# Patient Record
(demographics unavailable — no encounter records)

---

## 2018-01-10 NOTE — MISCELLANEOUS
Physical Exam    Wound #1 Assessment wound #1 Location:, sacrum, started on unknown, Care for this wound started on 3/15/2016  Wound Status: not healed  Pressure Ulcer Grade: Stage IV  Length: 10cm x Width: 11 2cm x Depth: 2cm   Total: 112sq cm   Wound Volume: 224cm3       Undermining 4cm from 9 o'clock to 5 o'clock     deepest at 5 o clock   Tissue type: Subcutaneous, Granulation, Slough and Exposed bone   Color of Wound: Yellow - 30% and Pink - 65% 5% bone   Exudate Amount: Moderate   Exudate Type: Serosangiunous   Odor: None   Exudate Color: Yellow   Wound Edges: Rolled (epibolized)   Periwound Skin Condition: Erythematous        Physician/Provider Wound #1 Exam   I agree with the nursing assessment and documentation  Sacral wound with small area of bone exposed, slough and granulation tissue noted, periwound clean  Wound Drsg  Orders/Instructions  Wound Identification Dressing Orders--Instructions:   Wound Identification and Instructions   Wound #1: sacrum    Wound Care Instructions  Discussed with Patient/Caregiver  Dressing Type: [place] sheet  Wash with mild soap and water, normal saline, wound cleanser  Secondary dressing apply: 5 x 9, 4x4 gauze  Secure with: Tape  Dressing change frequency: Daily  Comments/Other:           Wound Goals  Wound Goals:   Healing Goals:   Fair healing potential, expect slow healing progress secondary to co-morbid conditions and advanced age  Wound base will be 25%    Patient will achieve appropriate pressure redistribution for wound prevention       Judy Saunders 1: Wound Nursing Care Plan   Impaired Tissue Integrity related to: sacrum   Knowledge Deficit Related To: sacrum   Goals   Patient will maintain skin integrity:  Patient will demonstrate and verbalize knowledge of their disease process and management:     Patient will verbalize knowledge of and demonstrate adherence to interventions to achieve optimal nutrition required for wound healing:  Wound Nursing Care Interventions:   Implement offloading measures (turn & reposition schedule/method, ortho shoes/boots, floating heels, crutches, specialty surfaces:  Teach and evaluate effectiveness of offloading measures:     Teach patient and/or family about disease process and management methods:    Evaluate effectiveness of all above measures every 4 weeks with Patient Specific CQI:    Other:  plan of care initiated 3/15/2016, reviewed 4/12/2016      Signatures   Electronically signed by : Lula Durham MD; Apr 12 2016  4:29PM EST                       (Author)    Electronically signed by : Lula Durham MD; Apr 18 2016  1:14PM EST                       (Author)

## 2018-01-10 NOTE — MISCELLANEOUS
Physical Exam    Wound #1 Assessment wound #1 Location:, sacrum, started on unknown, Care for this wound started on 3/15/2016  Wound Status: not healed  Pressure Ulcer Grade: Stage IV  Length: 10cm x Width: 13cm x Depth: 3 5cm   Total: 130sq cm   Wound Volume: 455cm3       Undermining 5cm from 10 o'clock to 5 o'clock     deepest at 3 o clock   Tissue type: Subcutaneous, Granulation, Slough, Eschar and Exposed bone   Color of Wound: Red - 10%, Yellow - 40%, Black - 10% and Pink - 30% 10% bone   Exudate Amount: Moderate   Exudate Type: Serosangiunous   Odor: None   Wound Edges: Rolled (epibolized)   Periwound Skin Condition: Erythematous, blanchable erythema        Physician/Provider Wound #1 Exam   I agree with the nursing assessment and documentation  Wound Drsg  Orders/Instructions  Wound Identification Dressing Orders--Instructions:   Wound Identification and Instructions   Wound #1: sacrum    Wound Care Instructions  Discussed with Patient/Caregiver  Wash with mild soap and water, normal saline, wound cleanser or as specified  Apply 4% Topical Lidocaine anesthetic solution PRN to wound/ulcer prior to debridement for pain control  To periwound apply: nss moisten gauze  Secondary dressing apply: 5 x 9, and tape  Dressing change frequency: Three times per week  Comments/Other:   Nss moisten kerlix applied to wound bed today  NPWT to be applied at [facility]  Skin prep to ivon wound, Adaptic, Black sponge in wound bed, right buttock steri strip or VAC drape together, bridge to hip, seal at 125mmHg, continuous  Change 3x week      Wound Goals  Wound Goals:   Healing Goals:   Fair healing potential, expect slow healing progress secondary to co-morbid conditions and advanced age  Wound base will be 25%    Patient will achieve appropriate pressure redistribution for wound prevention       Judy Saunders 1:    Wound Nursing Care Plan   Impaired Tissue Integrity related to: sacrum   Knowledge Deficit Related To: sacrum   Goals   Patient will maintain skin integrity:  Patient will demonstrate and verbalize knowledge of their disease process and management:  Patient will verbalize knowledge of and demonstrate adherence to interventions to achieve optimal nutrition required for wound healing:  Wound Nursing Care Interventions:   Implement offloading measures (turn & reposition schedule/method, ortho shoes/boots, floating heels, crutches, specialty surfaces:  Teach and evaluate effectiveness of offloading measures:     Teach patient and/or family about disease process and management methods:    Evaluate effectiveness of all above measures every 4 weeks with Patient Specific CQI:    Other:  plan of care initiated 3/15/2016      Signatures   Electronically signed by : SHERRY James CRNP; Mar 24 2016  9:54AM EST                       (Author)    Electronically signed by : Ty Thorpe MD; Mar 28 2016  7:28AM EST                       (Author)

## 2018-01-10 NOTE — MISCELLANEOUS
Physical Exam    Wound #1 Assessment wound #1 Location:, sacrum, started on unknown, Care for this wound started on 3/15/2016  Wound Status: not healed  Pressure Ulcer Grade: Stage IV  Length: 9cm x Width: 6 5cm x Depth: 2 6cm   Total: 58 5sq cm   Wound Volume: 152 1cm3       Undermining 2 9cm from 8 o'clock to 4 o'clock     deepest at 4 o clock   Tissue type: Subcutaneous, Granulation and Slough   Color of Wound: Yellow - 50% and Pink - 50%   Exudate Amount: Moderate   Exudate Type: Serosangiunous   Odor: None   Exudate Color: Yellow   Wound Edges: Rolled (epibolized)   Periwound Skin Condition: Induration        Physician/Provider Wound #1 Exam   I agree with the nursing assessment and documentation  Sacrum with rolled edges, pink tissue on base, periwound intact  Wound Drsg  Orders/Instructions  Wound Identification Dressing Orders--Instructions:   Wound Identification and Instructions   Wound #1: sacrum    Wound Care Instructions  Discussed with Patient/Caregiver  Wash with mild soap and water, normal saline, wound cleanser  Secondary dressing apply: 5 x 9, 4x4 gauze  Secure with: Tape  Dressing change frequency: Daily, prn dislodgement/soilage  Comments/Other:   nss moisten gauze, 4x4, 5x9, tape  Change daily and prn dislodgement/soilage      Wound Goals  Wound Goals:   Healing Goals:   Fair healing potential, expect slow healing progress secondary to co-morbid conditions and advanced age  Wound depth will decrease by 25%   Patient will achieve appropriate pressure redistribution for wound prevention       Judy Saunders 1: Wound Nursing Care Plan   Impaired Tissue Integrity related to: sacrum   Knowledge Deficit Related To: sacrum   Goals   Patient will maintain skin integrity:  Patient will demonstrate and verbalize knowledge of their disease process and management:     Patient will verbalize knowledge of and demonstrate adherence to interventions to achieve optimal nutrition required for wound healing:  Wound Nursing Care Interventions:   Implement offloading measures (turn & reposition schedule/method, ortho shoes/boots, floating heels, crutches, specialty surfaces:  Teach and evaluate effectiveness of offloading measures:     Teach patient and/or family about disease process and management methods:    Evaluate effectiveness of all above measures every 4 weeks with Patient Specific CQI:    Other:  plan of care initiated 3/15/2016, reviewed 4/12/2016, reviewed 5/3/2016      Signatures   Electronically signed by : Phil Davidson MD; May 24 2016  3:57PM EST                       (Author)    Electronically signed by : Phil Davidson MD; John  3 2016  4:24PM EST                       (Author)

## 2018-01-10 NOTE — PROGRESS NOTES
Assessment    1  Decubitus ulcer of sacral region, stage 4 (707 03,707 24) (L89 [id number])    Plan  Decubitus ulcer of sacral region, stage 4    · Other Wound Care Instructions; Status:Complete;   Done: [id number] 04:27PM   Ordered; For:Decubitus ulcer of sacral region, stage 4; Ordered By:Romulo Simmons;  Instruction : wet to dry gauze    Wound Care Orders/Instructions    Wound Identification and Instructions   Wound #1: sacrum    Wound Care Instructions  Discussed with Patient/Caregiver  Wash with mild soap and water, normal saline, wound cleanser  Secondary dressing apply: 5 x 9, 4x4 gauze  Secure with: Tape  Dressing change frequency: Daily, prn dislodgement/soilage  Comments/Other:   nss moisten gauze, 4x4, 5x9, tape  Change daily and prn dislodgement/soilage      Wound Goals  Wound Goals:   Healing Goals:   Fair healing potential, expect slow healing progress secondary to co-morbid conditions and advanced age  Wound edges will appear with evidence of contraction and epithelialization   Patient will achieve appropriate pressure redistribution for wound prevention       Discussion/Summary    Wound clean and slowly improving  Will continue wet to dry dressings  Chief Complaint  Follow up for sacrum      History of Present Illness    Wound Identification HPI   Wound #1: sacrum          The patient came to Wound Care via wheelchair, assist x2 to exam chair  The patient is being seen for a follow-up with MD and @ [facility] at the [facility]  The patient is accompanied by his staff of [facility]  The patient's identification was verified  A secondary verification process was completed  Orientation: oriented to person, oriented to place and oriented to time  Blood Glucose:  Not applicable  3/15/2016: Spouse requested patient be evaluated for sacral wound  Spouse was not present at visit today  Patient arrived with lidocaine intact to sacrum    3/22/2016: Arrived with Lidocaine intact to sacrum  3/29/2016: Arrived with lidocaine intact to sacrum  4/5/2016: Arrived with lidocaine intact to the sacrum  4/12/2016: Arrived with lidocaine intact to sacrum  4/19/2016:Arrived with lidocaine intact to sacrum  4/26/2016: Arrived with lidocaine intact to sacrum  5/3/2016: Arrived with nss moisten gauze, 5x9, tape intact to sacrum  5/10/2016: Arrived with lidocaine intact to sacrum  SDTI to ivon wound  History of Falling: Yes = 25   Secondary Diagnosis: Yes = 15   Ambulatory Aid None, Bedrest, Nurse Assist = 0   No = 0   Gait: Impaired = 20 -- Short steps with shuffle, may have difficulty arising from chair, head down, significantly impaired balance, requiring furniture, support person, or walking aid to walk  Mental Status: Overestimates, forgets limitations = 15   Total Score: 75     > 45 = High Risk       The most recent fall occurred 5/2016 and fell out of chair  The fall resulted from fell out of chair  The fall was preceded by no known event  The fall resulted in fell on sacrum  A referral was made for did not go to the emergency department  Nutrition Assessment Screening: Food intake over the last 3 months due to the loss of appetite, digestive problems, chewing or swallowing difficulties is graded as: 2 = no decrease in food intake   Mobility scored as: 0 = bed or chair bound  Psychological Stress and Acute Disease Scored as: 0 = The patient has experienced psychological stress or acute disease in the last 3 months  Neuropsychological problems scored as: 1 = mild dementia  Provider Wound Care HPI: no new wound complaints         Pain Assessment   the patient states they have pain  The pain is located in the sacrum  The pain radiates to the denies  The patient describes the pain as burning  (on a scale of 0 to 10, the patient rates the pain at 3 )   Abuse And Domestic Violence Screen   Domestic violence screen not done today   Reason DV Screen not done: staff of [facility] present    Depression And Suicide Screen  Suicide screen not done today  Reason suicide screen not done: staff of [facility]  Prefered Language is  [place]  Primary Language is  English  Readiness To Learn: Receptive  Barriers To Learning: hard of hearing  Preferred Learning: written   Education Completed: treatment/procedure   Teaching Method: written   Person Taught: staff of [facility]    Evaluation Of Learning: verbalized/demonstrated understanding and needs reinforcement      Active Problems    1  Decubitus ulcer of sacral region, stage 4 (797 28,657 94) (D63 [id number])    Past Medical History    1  History of Enlarged prostate without lower urinary tract symptoms (600 00) (N40 0)   2  History of anemia (V12 3) (Z86 2)   3  History of essential hypertension (V12 59) (Z86 79)   4  History of glaucoma (V12 49) (Z86 69)   5  History of hypothyroidism (V12 29) (Z86 39)   6  History of Hypocalcemia (275 41) (E83 51)    Current Meds   1  Acetaminophen 325 MG Oral Tablet; Therapy: (Recorded:15Mar2016) to Recorded   2  Adult Aspirin EC Low Strength 81 MG Oral Tablet Delayed Release; Therapy: (Recorded:15Mar2016) to Recorded   3  Allopurinol 100 MG Oral Tablet; Therapy: (Recorded:15Mar2016) to Recorded   4  Cosopt 22 3-6 8 MG/ML Ophthalmic Solution; Therapy: (Recorded:15Mar2016) to Recorded   5  Ferrous Sulfate 325 (65 Fe) MG Oral Tablet; Therapy: (Recorded:15Mar2016) to Recorded   6  Flomax 0 4 MG CP24;   Therapy: (Recorded:15Mar2016) to Recorded   7  Gonak 2 5 % Ophthalmic Solution; Therapy: (Recorded:15Mar2016) to Recorded   8  Lasix 20 MG Oral Tablet; Therapy: (Recorded:15Mar2016) to Recorded   9  Levothyroxine Sodium 125 MCG CAPS; Therapy: (Recorded:15Mar2016) to Recorded   10  Lidocaine HCl (Local Anesth ) 4 % SOLN; apply prn to wound(s) prior to deridement for    pain control; Therapy: (Recorded:15Mar2016) to Recorded   11   Lumigan 0 01 % Ophthalmic Solution; Therapy: (Recorded:15Mar2016) to Recorded   12  Metoprolol Tartrate 25 MG Oral Tablet; Therapy: (Recorded:15Mar2016) to Recorded   13  OxyCODONE HCl - 5 MG Oral Tablet; Therapy: (Recorded:15Mar2016) to Recorded   14  Oyster Shell Calcium 500 MG Oral Tablet; Therapy: (Recorded:15Mar2016) to Recorded   15  Proscar 5 MG Oral Tablet; Therapy: (Recorded:15Mar2016) to Recorded   16  Sodium Bicarbonate 650 MG Oral Tablet; Therapy: (Recorded:15Mar2016) to Recorded   17  TobraDex 0 3-0 1 % Ophthalmic Suspension; Therapy: (Recorded:15Mar2016) to Recorded   18  Vitamin D3 1000 UNIT Oral Tablet; Therapy: (Recorded:15Mar2016) to Recorded    Allergies    1  Acetaminophen TABS   2  Hydrocodone-Acetaminophen CAPS   3  Morphine Sulfate TABS    Vitals  Vital Signs [Data Includes: Current Encounter]    Recorded: [id number] 04:21PM   Temperature 96 1 F, Tympanic   Heart Rate 80, R Radial   Pulse Quality Regular, R Radial   Respiration 18   Respiration Quality Normal   Systolic 181, RUE, Sitting   Diastolic 70, RUE, Sitting   Height 5 ft 8 in   Pain Scale 3     Physical Exam    Wound #1 Assessment wound #1 Location:, sacrum, started on unknown, Care for this wound started on 3/15/2016  Wound Status: not healed  Pressure Ulcer Grade: Stage IV  Length: 8cm x Width: 9cm x Depth: 2 7cm   Total: 72sq cm   Wound Volume: 194 4cm3       Undermining 4cm from 9 o'clock to 4 o'clock     deepest at 4 o clock   Tissue type: Subcutaneous, Granulation, Slough and Exposed bone   Color of Wound: Yellow - 30%, Black - 30% and Pink - 35% 5% bone exposed   Exudate Amount: Moderate   Exudate Type: Serosangiunous   Odor: None   Exudate Color: Yellow   Wound Edges: Rolled (epibolized)   Periwound Skin Condition: Erythematous, Macerated, Induration, SDTI to ivon wound area measures 4 [id number]        Physician/Provider Wound #1 Exam   I agree with the nursing assessment and documentation   Sacral wound mostly clean and granulating, edges rolling, periwound clean  Trg Chip 1: Wound Nursing Care Plan   Impaired Tissue Integrity related to: sacrum   Knowledge Deficit Related To: sacrum   Goals   Patient will maintain skin integrity:  Patient will demonstrate and verbalize knowledge of their disease process and management:  Patient will verbalize knowledge of and demonstrate adherence to interventions to achieve optimal nutrition required for wound healing:  Wound Nursing Care Interventions:   Implement offloading measures (turn & reposition schedule/method, ortho shoes/boots, floating heels, crutches, specialty surfaces:  Teach and evaluate effectiveness of offloading measures:  Teach patient and/or family about disease process and management methods:    Evaluate effectiveness of all above measures every 4 weeks with Patient Specific CQI:    Other:  plan of care initiated 3/15/2016, reviewed 4/12/2016, reviewed 5/3/2016      Procedure      Wound #1: sacrum     Nurse Dressing Change:   Wound #1 The wound located on the sacrum  Wound care rendered as per Physician/Advanced Practitioner order/plan  Order sent with patient to facility  Comments:    Excisional Debridement Subcutaneous Tissue:   Wound was excisionally debrided to subcutaneous tissue as follows  Prior to the procedure, the patient was identified using two identifiers, the general consent was signed, the proper site of procedure was identified, and a time out was taken  Anesthesia: Local anesthesia with 4% topical lidocaine was utilized prior to the procedure for pain control  A curette was utilized to surgically excise devitalized tissue and/or slough through epidermis, dermis and into the subcutaneous tissue  The total sq cm excised was 10sq cm  See wound assessment for further details  There was moderate bleeding controlled with gentle pressure and controlled with silver nitrate   the patient tolerated the procedure well without complication  CPT Code(s)   [id number] - Excisional DebridementTo Subcutaneous Tissue; first 20 sq cm        Signatures   Electronically signed by : Angelina Barclay MD; May 10 2016  4:28PM EST                       (Author)    Electronically signed by : Angelina Barclay MD; May 16 2016  9:40AM EST                       (Author)

## 2018-01-10 NOTE — MISCELLANEOUS
Physical Exam    Wound #1 Assessment wound #1 Location:, sacrum, started on unknown, Care for this wound started on 3/15/2016  Wound Status: not healed  Pressure Ulcer Grade: Stage IV  Length: 11cm x Width: 8cm x Depth: 2 4cm   Total: 88sq cm   Wound Volume: 211 2cm3       Undermining 4cm from 9 o'clock to 4 o'clock     deepest at 4 o clock   Tissue type: Subcutaneous, Granulation and Slough   Color of Wound: Red - 60% and Yellow - 40%   Exudate Amount: Moderate   Exudate Type: Serosangiunous   Odor: None   Exudate Color: Yellow   Wound Edges: Rolled (epibolized)   Periwound Skin Condition: Erythematous, Induration        Physician/Provider Wound #1 Exam   I agree with the nursing assessment and documentation  Sacral wound with healthy pink/granular tissue throughout wound, edges slightly rolled, periwound clean  Wound Drsg  Orders/Instructions  Wound Identification Dressing Orders--Instructions:   Wound Identification and Instructions   Wound #1: sacrum    Wound Care Instructions  Discussed with Patient/Caregiver  Wash with mild soap and water, normal saline, wound cleanser  Secondary dressing apply: 5 x 9, 4x4 gauze  Secure with: Tape  Dressing change frequency: Daily, prn dislodgement/soilage  Comments/Other:   nss moisten gauze, 4x4, 5x9, tape  Change daily and prn dislodgement/soilage      Wound Goals  Wound Goals:   Healing Goals:   Fair healing potential, expect slow healing progress secondary to co-morbid conditions and advanced age  Wound edges will appear with evidence of contraction and epithelialization   Patient will achieve appropriate pressure redistribution for wound prevention       Judy Saunders 1: Wound Nursing Care Plan   Impaired Tissue Integrity related to: sacrum   Knowledge Deficit Related To: sacrum   Goals   Patient will maintain skin integrity:     Patient will demonstrate and verbalize knowledge of their disease process and management:  Patient will verbalize knowledge of and demonstrate adherence to interventions to achieve optimal nutrition required for wound healing:  Wound Nursing Care Interventions:   Implement offloading measures (turn & reposition schedule/method, ortho shoes/boots, floating heels, crutches, specialty surfaces:  Teach and evaluate effectiveness of offloading measures:     Teach patient and/or family about disease process and management methods:    Evaluate effectiveness of all above measures every 4 weeks with Patient Specific CQI:    Other:  plan of care initiated 3/15/2016, reviewed 4/12/2016, reviewed 5/3/2016      Signatures   Electronically signed by : Noah Gibson MD; May  3 2016  3:53PM EST                       (Author)    Electronically signed by : Noha Gibson MD; May  9 2016  9:40AM EST                       (Author)

## 2018-01-11 NOTE — PROGRESS NOTES
Assessment    1  Decubitus ulcer of sacral region, stage 4 (707 03,707 24) (L89 154)    Plan  Decubitus ulcer of sacral region, stage 4    · Other Wound Care Instructions; Status:Complete;   Done: [id number] 02:14PM   Ordered; For:Decubitus ulcer of sacral region, stage 4; Ordered By:Romulo Simmons;  Instruction : wet to dry gauze    Wound Care Orders/Instructions    Wound Identification and Instructions   Wound #1: sacrum    Wound Care Instructions  Discussed with Patient/Caregiver  Dressing Type: Plain gauze  Wash with mild soap and water, normal saline, wound cleanser  Apply 4% Topical Lidocaine anesthetic solution PRN to wound/ulcer prior to debridement for pain control  Apply specified dressing to wound base/bed  Secondary dressing apply: 5 x 9  Secure with: Tape  Dressing change frequency: Daily, and prn soilage/dislodgement  Comments/Other:   NSS wet to day      Wound Goals  Wound Goals:   Healing Goals:   Fair healing potential, expect slow healing progress secondary to co-morbid conditions and advanced age  Wound depth will decrease by 25%   Patient will achieve appropriate pressure redistribution for wound prevention       Discussion/Summary    Sacral wound continues to get smaller with current regime  May need to aggressively remove all rolled edges in next visit or two  Chief Complaint  Follow up for sacrum      History of Present Illness    Wound Identification HPI   Wound #1: sacrum          The patient came to Wound Care via wheelchair, assist x2 to exam chair  The patient is being seen for a follow-up with MD and @ [facility] [address] at the [address] [place] [address]  The patient is accompanied by his staff of [facility] [address]  The patient's identification was verified  A secondary verification process was completed  Orientation: oriented to person, oriented to place and oriented to time  Blood Glucose:  Not applicable     3/15/2016: Spouse requested patient be evaluated for sacral wound  Spouse was not present at visit today  Patient arrived with lidocaine intact to sacrum    3/22/2016: Arrived with Lidocaine intact to sacrum  3/29/2016: Arrived with lidocaine intact to sacrum  4/5/2016: Arrived with lidocaine intact to the sacrum  4/12/2016: Arrived with lidocaine intact to sacrum  4/19/2016:Arrived with lidocaine intact to sacrum  4/26/2016: Arrived with lidocaine intact to sacrum  5/3/2016: Arrived with nss moisten gauze, 5x9, tape intact to sacrum  5/10/2016: Arrived with lidocaine intact to sacrum  SDTI to ivon wound  5/17/2016: Arrived with nss moisten gauze stool covered  Incontinent of stool  Stool found in wound bed  5/24/2016: Arrived with lidocaine intact to sacrum  5/31/16: The patient arrived with lidocaine soaked gauze intact to sacral wound  History of Falling: Yes = 25   Secondary Diagnosis: Yes = 15   Ambulatory Aid None, Bedrest, Nurse Assist = 0   No = 0   Gait: Impaired = 20 -- Short steps with shuffle, may have difficulty arising from chair, head down, significantly impaired balance, requiring furniture, support person, or walking aid to walk  Mental Status: Overestimates, forgets limitations = 15   Total Score: 75     > 45 = High Risk       The most recent fall occurred 5/2016 and denies any recent falls  Nutrition Assessment Screening: Food intake over the last 3 months due to the loss of appetite, digestive problems, chewing or swallowing difficulties is graded as: 2 = no decrease in food intake   Mobility scored as: 0 = bed or chair bound  Psychological Stress and Acute Disease Scored as: 0 = The patient has experienced psychological stress or acute disease in the last 3 months  Neuropsychological problems scored as: 1 = mild dementia  Provider Wound Care HPI: no new wound complaints         Pain Assessment   the patient states they do not have pain   (on a scale of 0 to 10, the patient rates the pain at 0 )   Abuse And Domestic Violence Screen   Domestic violence screen not done today  Reason DV Screen not done: staff of [address] [place] [address] present    Depression And Suicide Screen  Suicide screen not done today  Reason suicide screen not done: staff of [address] [place] [address]  Prefered Language is  Michele Moyer  Primary Language is  English  Readiness To Learn: Receptive  Barriers To Learning: hard of hearing  Preferred Learning: written   Education Completed: treatment/procedure   Teaching Method: written   Person Taught: staff of [facility]    Evaluation Of Learning: verbalized/demonstrated understanding and needs reinforcement      Active Problems    1  Decubitus ulcer of sacral region, stage 4 (834 35,504 92) (C85 767)    Past Medical History    1  History of Enlarged prostate without lower urinary tract symptoms (600 00) (N40 0)   2  History of anemia (V12 3) (Z86 2)   3  History of essential hypertension (V12 59) (Z86 79)   4  History of glaucoma (V12 49) (Z86 69)   5  History of hypothyroidism (V12 29) (Z86 39)   6  History of Hypocalcemia (275 41) (E83 51)    Current Meds   1  Acetaminophen 325 MG Oral Tablet; Therapy: (Recorded:15Mar2016) to Recorded   2  Adult Aspirin EC Low Strength 81 MG Oral Tablet Delayed Release; Therapy: (Recorded:15Mar2016) to Recorded   3  Allopurinol 100 MG Oral Tablet; Therapy: (Recorded:15Mar2016) to Recorded   4  Cosopt 22 3-6 8 MG/ML Ophthalmic Solution; Therapy: (Recorded:15Mar2016) to Recorded   5  Ferrous Sulfate 325 (65 Fe) MG Oral Tablet; Therapy: (Recorded:15Mar2016) to Recorded   6  Flomax 0 4 MG CP24;   Therapy: (Recorded:15Mar2016) to Recorded   7  Gonak 2 5 % Ophthalmic Solution; Therapy: (Recorded:15Mar2016) to Recorded   8  Lasix 20 MG Oral Tablet; Therapy: (Recorded:15Mar2016) to Recorded   9  Levothyroxine Sodium 125 MCG CAPS; Therapy: (Recorded:15Mar2016) to Recorded   10   Lidocaine HCl (Local Anesth ) 4 % SOLN; apply prn to wound(s) prior to deridement for    pain control; Therapy: (Recorded:15Mar2016) to Recorded   11  Lumigan 0 01 % Ophthalmic Solution; Therapy: (Recorded:15Mar2016) to Recorded   12  Metoprolol Tartrate 25 MG Oral Tablet; Therapy: (Recorded:15Mar2016) to Recorded   13  OxyCODONE HCl - 5 MG Oral Tablet; Therapy: (Recorded:15Mar2016) to Recorded   14  Oyster Shell Calcium 500 MG Oral Tablet; Therapy: (Recorded:15Mar2016) to Recorded   15  Proscar 5 MG Oral Tablet; Therapy: (Recorded:15Mar2016) to Recorded   16  Sodium Bicarbonate 650 MG Oral Tablet; Therapy: (Recorded:15Mar2016) to Recorded   17  TobraDex 0 3-0 1 % Ophthalmic Suspension; Therapy: (Recorded:15Mar2016) to Recorded   18  Vitamin D3 1000 UNIT Oral Tablet; Therapy: (Recorded:15Mar2016) to Recorded    Allergies    1  Acetaminophen TABS   2  Hydrocodone-Acetaminophen CAPS   3  Morphine Sulfate TABS    Vitals  Vital Signs [Data Includes: Current Encounter]    Recorded: 48PKW0065 01:50PM   Temperature 97 6 F, Tympanic   Heart Rate 80   Respiration 18   Systolic 056   Diastolic 58   Height 5 ft 8 in   Weight Unobtainable Yes   Pain Scale 0     Physical Exam    Wound #1 Assessment wound #1 Location:, sacrum, started on unknown, Care for this wound started on 3/15/2016  Wound Status: not healed  Pressure Ulcer Grade: Stage IV  Length: 8 8cm x Width: 6 3cm x Depth: 1 7cm   Total: 55 44sq cm   Wound Volume: 94 248cm3       Undermining 2 1cm from 7 o'clock to 4 o'clock     deepest at 4 o clock   Tissue type: Subcutaneous, Granulation and Slough   Color of Wound: Red - 60%, Yellow - 40% and Pink - 50%   Exudate Amount: Moderate   Exudate Type: Serosangiunous   Odor: None   Wound Edges: Rolled (epibolized)   Periwound Skin Condition: Macerated, Induration        Physician/Provider Wound #1 Exam   I agree with the nursing assessment and documentation  Sacral wound with rolled edges, min maceration of periwound at 6 o'clock otherwise pink tissue in wound, periwound clean        Nrsg Wound [id number]: Wound Nursing Care Plan   Impaired Tissue Integrity related to: sacrum   Knowledge Deficit Related To: sacrum   Goals   Patient will maintain skin integrity:  Patient will demonstrate and verbalize knowledge of their disease process and management:  Patient will verbalize knowledge of and demonstrate adherence to interventions to achieve optimal nutrition required for wound healing:  Wound Nursing Care Interventions:   Implement offloading measures (turn & reposition schedule/method, ortho shoes/boots, floating heels, crutches, specialty surfaces:  Teach and evaluate effectiveness of offloading measures:  Teach patient and/or family about disease process and management methods:    Evaluate effectiveness of all above measures every 4 weeks with Patient Specific CQI:    Other:  plan of care initiated 3/15/2016, reviewed 4/12/2016, reviewed 5/3/2016      Procedure      Wound #1: sacrum     Nurse Dressing Change:   Wound #1 The wound located on the sacrum  Applied 4% topical Lidocaine solution to wound/ulcer prior to debridement for pain control  Wound care rendered as per Physician/Advanced Practitioner order/plan  Order sent with patient to facility  Comments:    Excisional Debridement Subcutaneous Tissue:   Wound was excisionally debrided to subcutaneous tissue as follows  Prior to the procedure, the patient was identified using two identifiers, the general consent was signed, the proper site of procedure was identified, and a time out was taken  Anesthesia: Local anesthesia with 4% topical lidocaine was utilized prior to the procedure for pain control  A curette was utilized to surgically excise devitalized tissue and/or slough through epidermis, dermis and into the subcutaneous tissue  The total sq cm excised was 10sq cm  See wound assessment for further details   There was moderate bleeding controlled with gentle pressure and controlled with silver nitrate  the patient tolerated the procedure well without complication  CPT Code(s)   [id number] - Excisional DebridementTo Subcutaneous Tissue; first 20 sq cm        Signatures   Electronically signed by : Angelina Barclay MD; May 31 2016  2:14PM EST                       (Author)    Electronically signed by : Angelina Barclay MD; Jun 1 2016  9:59AM EST                       (Author)

## 2018-01-11 NOTE — PROGRESS NOTES
Assessment    1  Decubitus ulcer of sacral region, stage 4 (707 03,707 24) (L89 154)    Plan  Decubitus ulcer of sacral region, stage 4    · [facility] instructions given; Status:Complete;   Done: [id number]   Ordered; For:Decubitus ulcer of sacral region, stage 4; Ordered By:Romulo Simmons;  Ribbon or Sheet? : Mesalt Sheet    Wound Care Orders/Instructions    Wound Identification and Instructions   Wound #1: sacrum    Wound Care Instructions  Discussed with Patient/Caregiver  Dressing Type: Mes[place] sheet  Wash with mild soap and water, normal saline, wound cleanser  Secondary dressing apply: 5 x 9, 4x4 gauze  Secure with: Tape  Dressing change frequency: Daily, prn dislodgement/soilage  Comments/Other:           Wound Goals  Wound Goals:   Healing Goals:   Fair healing potential, expect slow healing progress secondary to co-morbid conditions and advanced age  Wound depth will decrease by 25%   Patient will achieve appropriate pressure redistribution for wound prevention       Discussion/Summary    80year old with stage 4 sacral wound which is improving on Mesalt  will continue  Chief Complaint  Follow up for sacrum      History of Present Illness    Wound Identification HPI   Wound #1: sacrum          The patient came to Wound Care via stretcher, patient remains on stretcher during visit  The patient is being seen for a follow-up with MD and @ [facility] at the [address] [place] [address] Avenue  The patient is accompanied by his staff of [facility]  The patient's identification was verified  A secondary verification process was completed  Orientation: oriented to person, oriented to place and oriented to time  Blood Glucose:  Not applicable  3/15/2016: Spouse requested patient be evaluated for sacral wound  Spouse was not present at visit today  Patient arrived with lidocaine intact to sacrum    3/22/2016: Arrived with Lidocaine intact to sacrum  3/29/2016: Arrived with lidocaine intact to sacrum  4/5/2016: Arrived with lidocaine intact to the sacrum  4/12/2016: Arrived with lidocaine intact to sacrum  4/19/2016:Arrived with lidocaine intact to sacrum  History of Falling:   Secondary Diagnosis: Yes = 15   Ambulatory Aid None, Bedrest, Nurse Assist = 0   No = 0   Gait: Impaired = 20 -- Short steps with shuffle, may have difficulty arising from chair, head down, significantly impaired balance, requiring furniture, support person, or walking aid to walk  Mental Status: Overestimates, forgets limitations = 15   Total Score: 50     > 45 = High Risk       The most recent fall occurred denies any recent falls  Nutrition Assessment Screening: Food intake over the last 3 months due to the loss of appetite, digestive problems, chewing or swallowing difficulties is graded as: 2 = no decrease in food intake   Mobility scored as: 0 = bed or chair bound  Psychological Stress and Acute Disease Scored as: 0 = The patient has experienced psychological stress or acute disease in the last 3 months  Neuropsychological problems scored as: 1 = mild dementia  Provider Wound Care HPI: no new wound care complaints         Pain Assessment   the patient states they have pain  The pain is located in the sacrum  The pain radiates to the denies  The patient describes the pain as burning  (on a scale of 0 to 10, the patient rates the pain at 3 )   Abuse And Domestic Violence Screen   Domestic violence screen not done today  Reason DV Screen not done: staff of [address] [place] [address] present    Depression And Suicide Screen  Suicide screen not done today  Reason suicide screen not done: staff of [address] [place] [address]  Prefered Language is  [place]  Primary Language is  English  Readiness To Learn: Receptive  Barriers To Learning: hard of hearing     Preferred Learning: written   Education Completed: treatment/procedure   Teaching Method: written   Person Taught: staff of [facility]    Evaluation Of Learning: verbalized/demonstrated understanding and needs reinforcement      Active Problems    1  Decubitus ulcer of sacral region, stage 4 (581 66,441 61) (O90 610)    Past Medical History    1  History of Enlarged prostate without lower urinary tract symptoms (600 00) (N40 0)   2  History of anemia (V12 3) (Z86 2)   3  History of essential hypertension (V12 59) (Z86 79)   4  History of glaucoma (V12 49) (Z86 69)   5  History of hypothyroidism (V12 29) (Z86 39)   6  History of Hypocalcemia (275 41) (E83 51)    Current Meds   1  Acetaminophen 325 MG Oral Tablet; Therapy: (Recorded:15Mar2016) to Recorded   2  Adult Aspirin EC Low Strength 81 MG Oral Tablet Delayed Release; Therapy: (Recorded:15Mar2016) to Recorded   3  Allopurinol 100 MG Oral Tablet; Therapy: (Recorded:15Mar2016) to Recorded   4  Cosopt 22 3-6 8 MG/ML Ophthalmic Solution; Therapy: (Recorded:15Mar2016) to Recorded   5  Ferrous Sulfate 325 (65 Fe) MG Oral Tablet; Therapy: (Recorded:15Mar2016) to Recorded   6  Flomax 0 4 MG CP24;   Therapy: (Recorded:15Mar2016) to Recorded   7  Gonak 2 5 % Ophthalmic Solution; Therapy: (Recorded:15Mar2016) to Recorded   8  Lasix 20 MG Oral Tablet; Therapy: (Recorded:15Mar2016) to Recorded   9  Levothyroxine Sodium 125 MCG CAPS; Therapy: (Recorded:15Mar2016) to Recorded   10  Lidocaine HCl (Local Anesth ) 4 % SOLN; apply prn to wound(s) prior to deridement for    pain control; Therapy: (Recorded:15Mar2016) to Recorded   11  Lumigan 0 01 % Ophthalmic Solution; Therapy: (Recorded:15Mar2016) to Recorded   12  Metoprolol Tartrate 25 MG Oral Tablet; Therapy: (Recorded:15Mar2016) to Recorded   13  OxyCODONE HCl - 5 MG Oral Tablet; Therapy: (Recorded:15Mar2016) to Recorded   14  Oyster Shell Calcium 500 MG Oral Tablet; Therapy: (Recorded:15Mar2016) to Recorded   15  Proscar 5 MG Oral Tablet; Therapy: (Recorded:15Mar2016) to Recorded   16  Sodium Bicarbonate 650 MG Oral Tablet;     Therapy: (Recorded:15Mar2016) to Recorded   17  TobraDex 0 3-0 1 % Ophthalmic Suspension; Therapy: (Recorded:15Mar2016) to Recorded   18  Vitamin D3 1000 UNIT Oral Tablet; Therapy: (Recorded:15Mar2016) to Recorded    Allergies    1  Acetaminophen TABS   2  Hydrocodone-Acetaminophen CAPS   3  Morphine Sulfate TABS    Vitals  Vital Signs [Data Includes: Current Encounter]    Recorded: 19Apr2016 02:12PM   Temperature 97 6 F, Tympanic   Heart Rate 76, R Radial   Pulse Quality Regular, R Radial   Respiration 22   Respiration Quality Normal   Systolic 035, RUE, Sitting   Diastolic 68, RUE, Sitting   Height 5 ft 8 in   Weight 159 lb 0 8 oz   BMI Calculated 24 18   BSA Calculated 1 85   Pain Scale 3     Physical Exam    Wound #1 Assessment wound #1 Location:, sacrum, started on unknown, Care for this wound started on 3/15/2016  Wound Status: not healed  Pressure Ulcer Grade: Stage IV  Length: 10cm x Width: 10cm x Depth: 3cm   Total: 100sq cm   Wound Volume: 300cm3       Undermining 2cm from 9 o'clock to 4 o'clock     deepest at 5 o clock   Tissue type: Subcutaneous, Granulation, Slough and Exposed bone   Color of Wound: Yellow - 30% and Pink - 65% 5% bone   Exudate Amount: Moderate   Exudate Type: Serosangiunous   Odor: None   Exudate Color: Yellow   Wound Edges: Rolled (epibolized)   Periwound Skin Condition: Erythematous         Physician/Provider Wound #1 Exam   I agree with the nursing assessment and documentation  Sacral wound with some of edge rolling, good granulation tissue in wound, periwound clean  Trg Chip 1: Wound Nursing Care Plan   Impaired Tissue Integrity related to: sacrum   Knowledge Deficit Related To: sacrum   Goals   Patient will maintain skin integrity:  Patient will demonstrate and verbalize knowledge of their disease process and management:     Patient will verbalize knowledge of and demonstrate adherence to interventions to achieve optimal nutrition required for wound healing:  Wound Nursing Care Interventions:   Implement offloading measures (turn & reposition schedule/method, ortho shoes/boots, floating heels, crutches, specialty surfaces:  Teach and evaluate effectiveness of offloading measures:  Teach patient and/or family about disease process and management methods:    Evaluate effectiveness of all above measures every 4 weeks with Patient Specific CQI:    Other:  plan of care initiated 3/15/2016, reviewed 4/12/2016      Procedure      Wound #1: sacrum     Nurse Dressing Change:   Wound #1 The wound located on the sacrum  Wound care rendered as per Physician/Advanced Practitioner order/plan  Order sent with patient to facility  Comments:    Excisional Debridement Subcutaneous Tissue:   Wound was excisionally debrided to subcutaneous tissue as follows  Prior to the procedure, the patient was identified using two identifiers, the general consent was signed, the proper site of procedure was identified, and a time out was taken  Anesthesia: Local anesthesia with 4% topical lidocaine was utilized prior to the procedure for pain control  A curette and forceps was utilized to surgically excise devitalized tissue and/or slough through epidermis, dermis and into the subcutaneous tissue  The total sq cm excised was 10sq cm  See wound assessment for further details  There was moderate bleeding controlled with gentle pressure  the patient tolerated the procedure well without complication  CPT Code(s)   [id number] - Excisional DebridementTo Subcutaneous Tissue; first 20 sq cm        Signatures   Electronically signed by : Jacki Heaton MD; Apr 19 2016  2:34PM EST                       (Author)    Electronically signed by : Jacki Heaton MD; Apr 25 2016  9:11AM EST                       (Author)

## 2018-01-12 NOTE — PROGRESS NOTES
Assessment    1  Decubitus ulcer of sacral region, stage 4 (707 03,707 24) (L89 [id number])    Plan  Decubitus ulcer of sacral region, stage 4    · Other Wound Care Instructions; Status:Complete;   Done: [id number] 03:52PM   Ordered; For:Decubitus ulcer of sacral region, stage 4; Ordered By:Romulo Simmons;  Instruction : wet to dry gauze    Wound Care Orders/Instructions    Wound Identification and Instructions   Wound #1: sacrum    Wound Care Instructions  Discussed with Patient/Caregiver  Wash with mild soap and water, normal saline, wound cleanser  Secondary dressing apply: 5 x 9, 4x4 gauze  Secure with: Tape  Dressing change frequency: Daily, prn dislodgement/soilage  Comments/Other:   nss moisten gauze, 4x4, 5x9, tape  Change daily and prn dislodgement/soilage      Wound Goals  Wound Goals:   Healing Goals:   Fair healing potential, expect slow healing progress secondary to co-morbid conditions and advanced age  Wound edges will appear with evidence of contraction and epithelialization   Patient will achieve appropriate pressure redistribution for wound prevention       Discussion/Summary    Sacral wound is clean and improving with current therapy  Will continue wet to dry gauze dressing changes  Chief Complaint  Follow up for sacrum      History of Present Illness    Wound Identification HPI   Wound #1: sacrum          The patient came to Wound Care via wheelchair, assist x2 to exam chair  The patient is being seen for a follow-up with MD and @ [facility] at the [employer]  The patient is accompanied by his staff of [facility]  The patient's identification was verified  A secondary verification process was completed  Orientation: oriented to person, oriented to place and oriented to time  Blood Glucose:  Not applicable  3/15/2016: Spouse requested patient be evaluated for sacral wound  Spouse was not present at visit today  Patient arrived with lidocaine intact to sacrum  Baljit Valadez 3/22/2016: Arrived with Lidocaine intact to sacrum  3/29/2016: Arrived with lidocaine intact to sacrum  4/5/2016: Arrived with lidocaine intact to the sacrum  4/12/2016: Arrived with lidocaine intact to sacrum  4/19/2016:Arrived with lidocaine intact to sacrum  4/26/2016: Arrived with lidocaine intact to sacrum  5/3/2016: Arrived with nss moisten gauze, 5x9, tape intact to sacrum  History of Falling: No = 0   Secondary Diagnosis: Yes = 15   Ambulatory Aid None, Bedrest, Nurse Assist = 0   No = 0   Gait: Impaired = 20 -- Short steps with shuffle, may have difficulty arising from chair, head down, significantly impaired balance, requiring furniture, support person, or walking aid to walk  Mental Status: Overestimates, forgets limitations = 15   Total Score: 50     > 45 = High Risk       The most recent fall occurred denies any recent falls  Nutrition Assessment Screening: Food intake over the last 3 months due to the loss of appetite, digestive problems, chewing or swallowing difficulties is graded as: 2 = no decrease in food intake   Mobility scored as: 0 = bed or chair bound  Psychological Stress and Acute Disease Scored as: 0 = The patient has experienced psychological stress or acute disease in the last 3 months  Neuropsychological problems scored as: 1 = mild dementia  Provider Wound Care HPI: no new wound complaints         Pain Assessment   the patient states they have pain  The pain is located in the sacrum  The pain radiates to the denies  The patient describes the pain as burning  (on a scale of 0 to 10, the patient rates the pain at 3 )   Abuse And Domestic Violence Screen   Domestic violence screen not done today  Reason DV Screen not done: staff of [facility] present    Depression And Suicide Screen  Suicide screen not done today  Reason suicide screen not done: staff of [address]  Prefered Language is  [place]  Primary Language is  English     Readiness To Learn: Receptive  Barriers To Learning: hard of hearing  Preferred Learning: written   Education Completed: treatment/procedure   Teaching Method: written   Person Taught: staff of [employer]    Evaluation Of Learning: verbalized/demonstrated understanding and needs reinforcement      Active Problems    1  Decubitus ulcer of sacral region, stage 4 (399 50,667 11) (W95 [id number])    Past Medical History    1  History of Enlarged prostate without lower urinary tract symptoms (600 00) (N40 0)   2  History of anemia (V12 3) (Z86 2)   3  History of essential hypertension (V12 59) (Z86 79)   4  History of glaucoma (V12 49) (Z86 69)   5  History of hypothyroidism (V12 29) (Z86 39)   6  History of Hypocalcemia (275 41) (E83 51)    Current Meds   1  Acetaminophen 325 MG Oral Tablet; Therapy: (Recorded:15Mar2016) to Recorded   2  Adult Aspirin EC Low Strength 81 MG Oral Tablet Delayed Release; Therapy: (Recorded:15Mar2016) to Recorded   3  Allopurinol 100 MG Oral Tablet; Therapy: (Recorded:15Mar2016) to Recorded   4  Cosopt 22 3-6 8 MG/ML Ophthalmic Solution; Therapy: (Recorded:15Mar2016) to Recorded   5  Ferrous Sulfate 325 (65 Fe) MG Oral Tablet; Therapy: (Recorded:15Mar2016) to Recorded   6  Flomax 0 4 MG CP24;   Therapy: (Recorded:15Mar2016) to Recorded   7  Gonak 2 5 % Ophthalmic Solution; Therapy: (Recorded:15Mar2016) to Recorded   8  Lasix 20 MG Oral Tablet; Therapy: (Recorded:15Mar2016) to Recorded   9  Levothyroxine Sodium 125 MCG CAPS; Therapy: (Recorded:15Mar2016) to Recorded   10  Lidocaine HCl (Local Anesth ) 4 % SOLN; apply prn to wound(s) prior to deridement for    pain control; Therapy: (Recorded:15Mar2016) to Recorded   11  Lumigan 0 01 % Ophthalmic Solution; Therapy: (Recorded:15Mar2016) to Recorded   12  Metoprolol Tartrate 25 MG Oral Tablet; Therapy: (Recorded:15Mar2016) to Recorded   13  OxyCODONE HCl - 5 MG Oral Tablet; Therapy: (Recorded:15Mar2016) to Recorded   14   MakeSpace Calcium 500 MG Oral Tablet; Therapy: (Recorded:15Mar2016) to Recorded   15  Proscar 5 MG Oral Tablet; Therapy: (Recorded:15Mar2016) to Recorded   16  Sodium Bicarbonate 650 MG Oral Tablet; Therapy: (Recorded:15Mar2016) to Recorded   17  TobraDex 0 3-0 1 % Ophthalmic Suspension; Therapy: (Recorded:15Mar2016) to Recorded   18  Vitamin D3 1000 UNIT Oral Tablet; Therapy: (Recorded:15Mar2016) to Recorded    Allergies    1  Acetaminophen TABS   2  Hydrocodone-Acetaminophen CAPS   3  Morphine Sulfate TABS    Vitals  Vital Signs [Data Includes: Current Encounter]    Recorded: [id number] 03:35PM   Temperature 97 4 F, Tympanic   Heart Rate 80, R Radial   Pulse Quality Regular, R Radial   Respiration 18   Respiration Quality Normal   Systolic 100, RUE, Sitting   Diastolic 50, RUE, Sitting   Height 5 ft 8 in   Weight 159 lb 0 8 oz   BMI Calculated 24 18   BSA Calculated 1 85   Pain Scale 3     Physical Exam    Wound #1 Assessment wound #1 Location:, sacrum, started on unknown, Care for this wound started on 3/15/2016  Wound Status: not healed  Pressure Ulcer Grade: Stage IV  Length: 11cm x Width: 8cm x Depth: 2 4cm   Total: 88sq cm   Wound Volume: 211 2cm3       Undermining 4cm from 9 o'clock to 4 o'clock     deepest at 4 o clock   Tissue type: Subcutaneous, Granulation and Slough   Color of Wound: Red - 60% and Yellow - 40%   Exudate Amount: Moderate   Exudate Type: Serosangiunous   Odor: None   Exudate Color: Yellow   Wound Edges: Rolled (epibolized)   Periwound Skin Condition: Erythematous, Induration        Physician/Provider Wound #1 Exam   I agree with the nursing assessment and documentation  Sacral wound with healthy pink/granular tissue throughout wound, edges slightly rolled, periwound clean  Trg Chip 1:    Wound Nursing Care Plan   Impaired Tissue Integrity related to: sacrum   Knowledge Deficit Related To: sacrum   Goals   Patient will maintain skin integrity:  Patient will demonstrate and verbalize knowledge of their disease process and management:  Patient will verbalize knowledge of and demonstrate adherence to interventions to achieve optimal nutrition required for wound healing:  Wound Nursing Care Interventions:   Implement offloading measures (turn & reposition schedule/method, ortho shoes/boots, floating heels, crutches, specialty surfaces:  Teach and evaluate effectiveness of offloading measures:  Teach patient and/or family about disease process and management methods:    Evaluate effectiveness of all above measures every 4 weeks with Patient Specific CQI:    Other:  plan of care initiated 3/15/2016, reviewed 4/12/2016, reviewed 5/3/2016      Procedure      Wound #1: sacrum     Nurse Dressing Change:   Wound #1 The wound located on the sacrum  Wound care rendered as per Physician/Advanced Practitioner order/plan  Order sent with patient to facility    Comments:      Signatures   Electronically signed by : Colton Asencio MD; May  3 2016  3:53PM EST                       (Author)    Electronically signed by : Colton Asencio MD; May  9 2016  9:40AM EST                       (Author)

## 2018-01-12 NOTE — MISCELLANEOUS
Physical Exam    Pulse Exam   Carotid: no bruit heard on the right and no bruit on the left  Wound #1 Assessment wound #1 Location:, sacrum, started on unknown, Care for this wound started on 3/15/2016  Wound Status: not healed  Pressure Ulcer Grade: Stage IV  Length: 10cm x Width: 11 5cm x Depth: 3 1cm   Total: 115sq cm   Wound Volume: 356 5cm3       Undermining 5 5cm from 10 o'clock to 5 o'clock      Tissue type: Subcutaneous, Granulation, Slough and Eschar   Color of Wound: Red - 30%, Yellow - 45%, Black - 15% and Pink - 20%   Exudate Amount: Large   Exudate Type: Serosangiunous   Odor: None   Wound Edges: Rolled (epibolized)   Periwound Skin Condition: Erythematous         Physician/Provider Wound #1 Exam   I agree with the nursing assessment and documentation  Sacrum with large open wound with small amount of exposed bone, moderate amount of slough on base mixed with pink tissue  SMall amount of old hematoma toward right side where it appears to have been opened  Constitutional - older white male  Ears, Nose, Mouth, and Throat - External inspection of ears and nose: Normal    Neck - Exam: Supple, symmetric, trachea midline, no masses  Pulmonary - Respiratory effort: No increased work of breathing or signs of respiratory distress  Auscultation of lungs: Clear to auscultation  Cardiovascular - Auscultation of heart: Normal rate and rhythm, normal S1 and S2, no murmurs  Abdomen - Abdomen: Non-tender, no masses  Lymphatic - Palpation of lymph nodes: No lymphadenopathy  Psychiatric - Mood and affect: Normal       Wound Drsg  Orders/Instructions  Wound Identification Dressing Orders--Instructions:   Wound Identification and Instructions   Wound #1: sacrum    Wound Care Instructions  Discussed with Patient/Caregiver  Wash with mild soap and water, normal saline, wound cleanser or as specified   Apply 4% Topical Lidocaine anesthetic solution PRN to wound/ulcer prior to debridement for pain control  Secondary dressing apply: 5 x 9, and tape  Dressing change frequency: Three times per week  Comments/Other:   NPWT to be applied at [facility]  Skin prep to ivon wound, Adaptic, Black sponge in wound bed, right buttock steri strip or VAC drape together, bridge to hip, seal at 125mmHg, continuous  Wound Goals  Wound Goals:   Healing Goals:   Fair healing potential, expect slow healing progress secondary to co-morbid conditions and advanced age  Wound base will be 25%    Patient will achieve appropriate pressure redistribution for wound prevention       Judy Saunders 1: Wound Nursing Care Plan   Impaired Tissue Integrity related to: sacrum   Knowledge Deficit Related To: sacrum   Goals   Patient will maintain skin integrity:  Patient will demonstrate and verbalize knowledge of their disease process and management:  Patient will verbalize knowledge of and demonstrate adherence to interventions to achieve optimal nutrition required for wound healing:  Wound Nursing Care Interventions:   Implement offloading measures (turn & reposition schedule/method, ortho shoes/boots, floating heels, crutches, specialty surfaces:  Teach and evaluate effectiveness of offloading measures:     Teach patient and/or family about disease process and management methods:    Evaluate effectiveness of all above measures every 4 weeks with Patient Specific CQI:    Other:  plan of care initiated 3/15/2016      Signatures   Electronically signed by : Arcelia Cancino MD; Mar 15 2016  5:36PM EST                       (Author)    Electronically signed by : Arcelia Cancino MD; Mar 21 2016  1:33PM EST                       (Author)

## 2018-01-12 NOTE — PROGRESS NOTES
Assessment    1  Decubitus ulcer of sacral region, stage 4 (707 03,707 24) (L89 154)    Plan  Decubitus ulcer of sacral region, stage 4    · [place] instructions given; Status:Complete;   Done: [id number] 04:33PM   Ordered; For:Decubitus ulcer of sacral region, stage 4; Ordered By:Romulo Simmons;  Ribbon or Sheet? : [place] Sheet    Wound Care Orders/Instructions    Wound Identification and Instructions   Wound #1: sacrum    Wound Care Instructions  Discussed with Patient/Caregiver  Dressing Type: [place] sheet  Wash with mild soap and water, normal saline, wound cleanser or as specified  Apply 4% Topical Lidocaine anesthetic solution PRN to wound/ulcer prior to debridement for pain control  Secondary dressing apply: 5 x 9, and tape  Dressing change frequency: Daily  Comments/Other:           Wound Goals  Wound Goals:   Healing Goals:   Fair healing potential, expect slow healing progress secondary to co-morbid conditions and advanced age  Wound base will be 25%    Patient will achieve appropriate pressure redistribution for wound prevention       Discussion/Summary    Will continue [place] sheet because of drainage  Chief Complaint  Follow up for sacrum      History of Present Illness    Wound Identification HPI   Wound #1: sacrum          The patient came to Wound Care via stretcher, patient remains on stretcher during visit  The patient is being seen for a follow-up with MD and @ [facility] at the [address]  The patient is accompanied by his staff of [facility]  The patient's identification was verified  A secondary verification process was completed  Orientation: oriented to person, oriented to place and oriented to time  Blood Glucose:  Not applicable  3/15/2016: Spouse requested patient be evaluated for sacral wound  Spouse was not present at visit today  Patient arrived with lidocaine intact to sacrum    3/22/2016: Arrived with Lidocaine intact to sacrum  3/29/2016: Arrived with lidocaine intact to sacrum  History of Falling:   Secondary Diagnosis: Yes = 15   Ambulatory Aid None, Bedrest, Nurse Assist = 0   No = 0   Gait: Impaired = 20 -- Short steps with shuffle, may have difficulty arising from chair, head down, significantly impaired balance, requiring furniture, support person, or walking aid to walk  Mental Status: Overestimates, forgets limitations = 15   Total Score: The most recent fall occurred unknown  Mobility scored as: 0 = bed or chair bound  Psychological Stress and Acute Disease Scored as: 0 = The patient has experienced psychological stress or acute disease in the last 3 months  Neuropsychological problems scored as: 1 = mild dementia  Provider Wound Care HPI: no new wound complaints         Pain Assessment   the patient states they do not have pain  (on a scale of 0 to 10, the patient rates the pain at 0 )   Abuse And Domestic Violence Screen   Domestic violence screen not done today  Reason DV Screen not done: staff of [facility] present    Depression And Suicide Screen  Suicide screen not done today  Reason suicide screen not done: staff of [facility]  Prefered Language is  english  Primary Language is  english  Readiness To Learn: Receptive  Barriers To Learning: hard of hearing  Preferred Learning: written   Education Completed: treatment/procedure   Teaching Method: written   Person Taught: staff [facility]    Evaluation Of Learning: verbalized/demonstrated understanding      Active Problems    1  Decubitus ulcer of sacral region, stage 4 (998 95,442 00) (P65 [id number])    Past Medical History    1  History of Enlarged prostate without lower urinary tract symptoms (600 00) (N40 0)   2  History of anemia (V12 3) (Z86 2)   3  History of essential hypertension (V12 59) (Z86 79)   4  History of glaucoma (V12 49) (Z86 69)   5  History of hypothyroidism (V12 29) (Z86 39)   6   History of Hypocalcemia (275 41) (E83 51)    Current Meds   1  Acetaminophen 325 MG Oral Tablet; Therapy: (Recorded:15Mar2016) to Recorded   2  Adult Aspirin EC Low Strength 81 MG Oral Tablet Delayed Release; Therapy: (Recorded:15Mar2016) to Recorded   3  Allopurinol 100 MG Oral Tablet; Therapy: (Recorded:15Mar2016) to Recorded   4  Cosopt 22 3-6 8 MG/ML Ophthalmic Solution; Therapy: (Recorded:15Mar2016) to Recorded   5  Ferrous Sulfate 325 (65 Fe) MG Oral Tablet; Therapy: (Recorded:15Mar2016) to Recorded   6  Flomax 0 4 MG CP24;   Therapy: (Recorded:15Mar2016) to Recorded   7  Gonak 2 5 % Ophthalmic Solution; Therapy: (Recorded:15Mar2016) to Recorded   8  Lasix 20 MG Oral Tablet; Therapy: (Recorded:15Mar2016) to Recorded   9  Levothyroxine Sodium 125 MCG CAPS; Therapy: (Recorded:15Mar2016) to Recorded   10  Lidocaine HCl (Local Anesth ) 4 % SOLN; apply prn to wound(s) prior to deridement for    pain control; Therapy: (Recorded:15Mar2016) to Recorded   11  Lumigan 0 01 % Ophthalmic Solution; Therapy: (Recorded:15Mar2016) to Recorded   12  Metoprolol Tartrate 25 MG Oral Tablet; Therapy: (Recorded:15Mar2016) to Recorded   13  OxyCODONE HCl - 5 MG Oral Tablet; Therapy: (Recorded:15Mar2016) to Recorded   14  Oyster Shell Calcium 500 MG Oral Tablet; Therapy: (Recorded:15Mar2016) to Recorded   15  Proscar 5 MG Oral Tablet; Therapy: (Recorded:15Mar2016) to Recorded   16  Sodium Bicarbonate 650 MG Oral Tablet; Therapy: (Recorded:15Mar2016) to Recorded   17  TobraDex 0 3-0 1 % Ophthalmic Suspension; Therapy: (Recorded:15Mar2016) to Recorded   18  Vitamin D3 1000 UNIT Oral Tablet; Therapy: (Recorded:15Mar2016) to Recorded    Allergies    1  Acetaminophen TABS   2  Hydrocodone-Acetaminophen CAPS   3   Morphine Sulfate TABS    Vitals  Vital Signs [Data Includes: Current Encounter]    Recorded: [id number] 04:22PM   Temperature 98 1 F, Tympanic   Heart Rate 62, R Radial   Pulse Quality Regular, R Radial   Respiration 20   Respiration Quality Normal   Systolic 095, RUE, Supine   Diastolic 78, RUE, Supine   Weight 159 lb    Pain Scale 0     Physical Exam    Wound #1 Assessment wound #1 Location:, sacrum, started on unknown, Care for this wound started on 3/15/2016  Wound Status: not healed  Pressure Ulcer Grade: Stage IV  Length: 12cm x Width: 11 3cm x Depth: 2 6cm   Total: 135 6sq cm   Wound Volume: 352 56cm3       Undermining 4 5cm from 9 o'clock to 5 o'clock     deepest at 5 o clock   Tissue type: Subcutaneous, Granulation, Slough, Eschar and Exposed bone   Color of Wound: Red - 20%, Yellow - 55%, Black - 5% and Pink - 15% 5% bone   Exudate Amount: Moderate   Exudate Type: Serosangiunous and thick   Odor: None   Exudate Color: Yellow   Wound Edges: Rolled (epibolized)   Periwound Skin Condition: Erythematous, pale pink         Physician/Provider Wound #1 Exam   I agree with the nursing assessment and documentation  Sacral wound with small area of bone exposed as well as fascia  moderate amount of slough on base of wound  pink tissue present, periwound clean  Trg olucry 1: Wound Nursing Care Plan   Impaired Tissue Integrity related to: sacrum   Knowledge Deficit Related To: sacrum   Goals   Patient will maintain skin integrity:  Patient will demonstrate and verbalize knowledge of their disease process and management:  Patient will verbalize knowledge of and demonstrate adherence to interventions to achieve optimal nutrition required for wound healing:  Wound Nursing Care Interventions:   Implement offloading measures (turn & reposition schedule/method, ortho shoes/boots, floating heels, crutches, specialty surfaces:  Teach and evaluate effectiveness of offloading measures:     Teach patient and/or family about disease process and management methods:    Evaluate effectiveness of all above measures every 4 weeks with Patient Specific CQI:    Other: plan of care initiated 3/15/2016      Procedure      Wound #1: sacrum     Nurse Dressing Change:   Wound #1 The wound located on the sacrum  Wound care rendered as per Physician/Advanced Practitioner order/plan  Order sent with patient to facility  Comments:    Wound Debridement Bone:   Wound was debrided to bone  Prior to the procedure, the patient was identified using two identifiers, the general consent was signed, the proper site of procedure was identified, and a time out was taken  Anesthesia: Local anesthesia with 4% topical lidocaine was utilized prior to the procedure for pain control  #15 surgical blade, a curette and forceps was utilized to surgically excise devitalized tissue and/or slough through epidermis, dermis, subcutaneous tissue, muscle and into the bone  The total sq cm excised was 40sq cm  See wound assessment for further details  There was moderate bleeding controlled with gentle pressure  the patient tolerated the procedure well without complication  CPT Code(s)   81473 - Excisional DebridementTo Bone; first 20 sq cm   20504 - Each Additional 20 sq cm - Excisional DebridementTo Bone        Signatures   Electronically signed by : Chuy Yang MD; Mar 29 2016  4:34PM EST                       (Author)    Electronically signed by : Chuy Yang MD; Mar 31 2016  7:23AM EST                       (Author)

## 2018-01-12 NOTE — MISCELLANEOUS
Physical Exam    Wound #1 Assessment wound #1 Location:, sacrum, started on unknown, Care for this wound started on 3/15/2016  Wound Status: not healed  Pressure Ulcer Grade: Stage IV  Length: 8cm x Width: 9cm x Depth: 2 7cm   Total: 72sq cm   Wound Volume: 194 4cm3       Undermining 4cm from 9 o'clock to 4 o'clock     deepest at 4 o clock   Tissue type: Subcutaneous, Granulation, Slough and Exposed bone   Color of Wound: Yellow - 30%, Black - 30% and Pink - 35% 5% bone exposed   Exudate Amount: Moderate   Exudate Type: Serosangiunous   Odor: None   Exudate Color: Yellow   Wound Edges: Rolled (epibolized)   Periwound Skin Condition: Erythematous, Macerated, Induration, SDTI to ivon wound area measures 4 [id number]        Physician/Provider Wound #1 Exam   I agree with the nursing assessment and documentation  Sacral wound mostly clean and granulating, edges rolling, periwound clean  Wound Drsg  Orders/Instructions  Wound Identification Dressing Orders--Instructions:   Wound Identification and Instructions   Wound #1: sacrum    Wound Care Instructions  Discussed with Patient/Caregiver  Wash with mild soap and water, normal saline, wound cleanser  Secondary dressing apply: 5 x 9, 4x4 gauze  Secure with: Tape  Dressing change frequency: Daily, prn dislodgement/soilage  Comments/Other:   nss moisten gauze, 4x4, 5x9, tape  Change daily and prn dislodgement/soilage      Wound Goals  Wound Goals:   Healing Goals:   Fair healing potential, expect slow healing progress secondary to co-morbid conditions and advanced age  Wound edges will appear with evidence of contraction and epithelialization   Patient will achieve appropriate pressure redistribution for wound prevention       Judy Saunders 1: Wound Nursing Care Plan   Impaired Tissue Integrity related to: sacrum   Knowledge Deficit Related To: sacrum   Goals   Patient will maintain skin integrity:     Patient will demonstrate and verbalize knowledge of their disease process and management:  Patient will verbalize knowledge of and demonstrate adherence to interventions to achieve optimal nutrition required for wound healing:  Wound Nursing Care Interventions:   Implement offloading measures (turn & reposition schedule/method, ortho shoes/boots, floating heels, crutches, specialty surfaces:  Teach and evaluate effectiveness of offloading measures:     Teach patient and/or family about disease process and management methods:    Evaluate effectiveness of all above measures every 4 weeks with Patient Specific CQI:    Other:  plan of care initiated 3/15/2016, reviewed 4/12/2016, reviewed 5/3/2016      Signatures   Electronically signed by : Josiah Zacarias MD; May 10 2016  4:28PM EST                       (Author)    Electronically signed by : Josiah Zacarias MD; May 16 2016  9:40AM EST                       (Author)

## 2018-01-13 NOTE — MISCELLANEOUS
Physical Exam    Wound #1 Assessment wound #1 Location:, sacrum, started on unknown, Care for this wound started on 3/15/2016  Wound Status: not healed  Pressure Ulcer Grade: Stage IV  Length: 9cm x Width: 10cm x Depth: 3 5cm   Total: 90sq cm   Wound Volume: 315cm3       Undermining 5 2cm from 9 o'clock to 5 o'clock     deepest at 5 o clock   Tissue type: Subcutaneous, Granulation, Slough, Eschar and Exposed bone   Color of Wound: Yellow - 60%, Black - 5% and Pink - 14% 1% bone, dark red 25%   Exudate Amount: Moderate   Exudate Type: Serosangiunous   Odor: None   Exudate Color: Yellow   Wound Edges: Rolled (epibolized)   Periwound Skin Condition: Erythematous, pale pink        Physician/Provider Wound #1 Exam   I agree with the nursing assessment and documentation  Sacral wound mostly pink and granular, fascia present on base with yellow slough, small area of exposed bone, edges beginning to roll, periwound clean  Wound Drsg  Orders/Instructions  Wound Identification Dressing Orders--Instructions:   Wound Identification and Instructions   Wound #1: sacrum    Wound Care Instructions  Discussed with Patient/Caregiver  Dressing Type: Mes[place] sheet  Secondary dressing apply: 5 x 9, and tape  Dressing change frequency: Daily  Comments/Other:           Wound Goals  Wound Goals:   Healing Goals:   Fair healing potential, expect slow healing progress secondary to co-morbid conditions and advanced age  Wound base will be 25%    Patient will achieve appropriate pressure redistribution for wound prevention       Judy Saunders 1: Wound Nursing Care Plan   Impaired Tissue Integrity related to: sacrum   Knowledge Deficit Related To: sacrum   Goals   Patient will maintain skin integrity:  Patient will demonstrate and verbalize knowledge of their disease process and management:     Patient will verbalize knowledge of and demonstrate adherence to interventions to achieve optimal nutrition required for wound healing:  Wound Nursing Care Interventions:   Implement offloading measures (turn & reposition schedule/method, ortho shoes/boots, floating heels, crutches, specialty surfaces:  Teach and evaluate effectiveness of offloading measures:     Teach patient and/or family about disease process and management methods:    Evaluate effectiveness of all above measures every 4 weeks with Patient Specific CQI:    Other:  plan of care initiated 3/15/2016      Signatures   Electronically signed by : Anika Luo MD; Apr 5 2016  3:46PM EST                       (Author)    Electronically signed by : Anika Luo MD; Apr 6 2016  4:12PM EST                       (Author)

## 2018-01-13 NOTE — MISCELLANEOUS
Physical Exam    Wound #1 Assessment wound #1 Location:, sacrum, started on unknown, Care for this wound started on 3/15/2016  Wound Status: not healed  Pressure Ulcer Grade: Stage IV  Length: 9cm x Width: 7cm x Depth: 2 7cm   Total: 63sq cm   Wound Volume: 170 1cm3       Undermining 3 5cm from 8 o'clock to 4 o'clock     deepest at 4 o clock   Tissue type: Subcutaneous, Granulation and Slough   Color of Wound: Yellow - 40% and Pink - 60%   Exudate Amount: Moderate   Exudate Type: Serosangiunous   Odor: None   Exudate Color: Yellow   Wound Edges: Rolled (epibolized)   Periwound Skin Condition: Denuded, Erythematous, Macerated, Induration        Physician/Provider Wound #1 Exam   I agree with the nursing assessment and documentation  Sacral wound with pink tissue on base, bone not exposed, edges rolling, periwound clean  Wound Drsg  Orders/Instructions  Wound Identification Dressing Orders--Instructions:   Wound Identification and Instructions   Wound #1: sacrum    Wound Care Instructions  Discussed with Patient/Caregiver  Wash with mild soap and water, normal saline, wound cleanser  Secondary dressing apply: 5 x 9, 4x4 gauze  Secure with: Tape  Dressing change frequency: Daily, prn dislodgement/soilage  Comments/Other:   nss moisten gauze, 4x4, 5x9, tape  Change daily and prn dislodgement/soilage      Wound Goals  Wound Goals:   Healing Goals:   Fair healing potential, expect slow healing progress secondary to co-morbid conditions and advanced age  Wound depth will decrease by 25%   Patient will achieve appropriate pressure redistribution for wound prevention       Judy Saunders 1: Wound Nursing Care Plan   Impaired Tissue Integrity related to: sacrum   Knowledge Deficit Related To: sacrum   Goals   Patient will maintain skin integrity:  Patient will demonstrate and verbalize knowledge of their disease process and management:     Patient will verbalize knowledge of and demonstrate adherence to interventions to achieve optimal nutrition required for wound healing:  Wound Nursing Care Interventions:   Implement offloading measures (turn & reposition schedule/method, ortho shoes/boots, floating heels, crutches, specialty surfaces:  Teach and evaluate effectiveness of offloading measures:     Teach patient and/or family about disease process and management methods:    Evaluate effectiveness of all above measures every 4 weeks with Patient Specific CQI:    Other:  plan of care initiated 3/15/2016, reviewed 4/12/2016, reviewed 5/3/2016      Signatures   Electronically signed by : Rachel Riggins MD; May 17 2016  2:16PM EST                       (Author)    Electronically signed by : Rachel Riggins MD; May 24 2016  2:32PM EST                       (Author)

## 2018-01-13 NOTE — PROGRESS NOTES
Assessment    1  Decubitus ulcer of sacral region, stage 4 (707 03,707 24) (L89 [id number])    Plan  Decubitus ulcer of sacral region, stage 4, PMH: Hypocalcemia    · Follow-up visit in 1 week Evaluation and Treatment  Follow-up  Status: Hold For -  Scheduling  Requested for: [id number]   Ordered; For: Decubitus ulcer of sacral region, stage 4, PMH: Hypocalcemia; Ordered By: Jean-Pierre Lane Performed:  Due: [id number]   · Other Wound Care Instructions; Status:Complete;   Done: [id number] 05:36PM   Ordered; For:Decubitus ulcer of sacral region, stage 4, PMH: Hypocalcemia; Ordered By:Jean-Pierre Simmons;  Instruction : VAC black sponge at 125 mmHg continuous suction    Wound Care Orders/Instructions    Wound Identification and Instructions   Wound #1: sacrum    Wound Care Instructions  Discussed with Patient/Caregiver  Wash with mild soap and water, normal saline, wound cleanser or as specified  Apply 4% Topical Lidocaine anesthetic solution PRN to wound/ulcer prior to debridement for pain control  Secondary dressing apply: 5 x 9, and tape  Dressing change frequency: Three times per week  Comments/Other:   NPWT to be applied at [facility]  Skin prep to ivon wound, Adaptic, Black sponge in wound bed, right buttock steri strip or VAC drape together, bridge to hip, seal at 125mmHg, continuous  Wound Goals  Wound Goals:   Healing Goals:   Fair healing potential, expect slow healing progress secondary to co-morbid conditions and advanced age  Wound base will be 25%    Patient will achieve appropriate pressure redistribution for wound prevention       Discussion/Summary    80year old male with large sacral wound  Will continue VAC at this time         Chief Complaint  Initial visit at [address] [place] [address] for sacrum   The patient is new to [facility]      History of Present Illness    Wound Identification HPI   Wound #1: sacrum      The patient came to Wound Care via stretcher, patient remains on stretcher during visit  The patient is being seen for an initial evaluation/start of care and @ [facility] at the [facility] [employer]  The patient is accompanied by his staff of [facility]  The patient's identification was verified  A secondary verification process was completed  Orientation: oriented to person, oriented to place and oriented to time  Blood Glucose:  Not applicable  3/15/2016: Spouse requested patient be evaluated for sacral wound  Spouse was not present at visit today  Patient arrived with lidocaine intact to sacrum  Franck Balint History of Falling:   Secondary Diagnosis: Yes = 15   Ambulatory Aid None, Bedrest, Nurse Assist = 0   No = 0   Gait: Impaired = 20 -- Short steps with shuffle, may have difficulty arising from chair, head down, significantly impaired balance, requiring furniture, support person, or walking aid to walk  Mental Status: Overestimates, forgets limitations = 15   Total Score:        Fernando Scale:   Sensory Perception: Slightly limited = 3   Moisture: Occasionally moist = 3   Activity: Bedfast = 1   Mobility: Completely immobile = 1   Nutrition: Adequate = 3   Friction and Shear: Potential problem = 2   Total Fernando Score: 13   The most recent fall occurred unknown  Mobility scored as: 0 = bed or chair bound  Psychological Stress and Acute Disease Scored as: 0 = The patient has experienced psychological stress or acute disease in the last 3 months  Neuropsychological problems scored as: 1 = mild dementia  Unable to obtain informatoin from patient and/or caregiver  information obtained from patient and medical record obtained from [facility]     Provider Wound Care HPI: 80year old male with stage 4 sacral decubitus admitted to [facility] for continued wound care  No specific complaints      Review of Systems    Constitutional: no fever or chills, feels well, no tiredness, no recent weight loss or weight gain     ENT: no complaints of earache, no loss of hearing, no nosebleeds or nasal discharge, no sore throat or hoarseness  Cardiovascular: no complaints of slow or fast heart rate, no chest pain, no palpitations, no leg claudication or lower extremity edema  Respiratory: no complaints of shortness of breath, no wheezing or cough, no dyspnea on exertion, no orthopnea or PND  Gastrointestinal: no complaints of abdominal pain, no constipation, no nausea or vomiting, no diarrhea or bloody stools  Genitourinary: no complaints of dysuria or incontinence, no hesitancy, no nocturia, no genital lesion, no inadequacy of penile erection  Musculoskeletal: no complaints of arthralgia, no myalgia, no joint swelling or stiffness, no limb pain or swelling  Integumentary: as noted in HPI  Neurological: no complaints of headache, no confusion, no numbness or tingling, no dizziness or fainting  Psychiatric: no depression, no mood disorders, no anxiety  Past Medical History    1  History of Enlarged prostate without lower urinary tract symptoms (600 00) (N40 0)   2  History of anemia (V12 3) (Z86 2)   3  History of essential hypertension (V12 59) (Z86 79)   4  History of glaucoma (V12 49) (Z86 69)   5  History of hypothyroidism (V12 29) (Z86 39)   6  History of Hypocalcemia (275 41) (E83 51)    The active problems and past medical history were reviewed and updated today  Surgical History    The surgical history was reviewed and updated today  Family History    The family history was reviewed and updated today  Social History  The social history was reviewed and updated today  Current Meds   1  Acetaminophen 325 MG Oral Tablet; Therapy: (Recorded:15Mar2016) to Recorded   2  Adult Aspirin EC Low Strength 81 MG Oral Tablet Delayed Release; Therapy: (Recorded:15Mar2016) to Recorded   3  Allopurinol 100 MG Oral Tablet; Therapy: (Recorded:15Mar2016) to Recorded   4  Cosopt 22 3-6 8 MG/ML Ophthalmic Solution;    Therapy: (Recorded:15Mar2016) to Recorded   5  Ferrous Sulfate 325 (65 Fe) MG Oral Tablet; Therapy: (Recorded:15Mar2016) to Recorded   6  Flomax 0 4 MG CP24;   Therapy: (Recorded:15Mar2016) to Recorded   7  Gonak 2 5 % Ophthalmic Solution; Therapy: (Recorded:15Mar2016) to Recorded   8  Lasix 20 MG Oral Tablet; Therapy: (Recorded:15Mar2016) to Recorded   9  Levothyroxine Sodium 125 MCG CAPS; Therapy: (Recorded:15Mar2016) to Recorded   10  Lidocaine HCl (Local Anesth ) 4 % SOLN; apply prn to wound(s) prior to deridement for    pain control; Therapy: (Recorded:15Mar2016) to Recorded   11  Lumigan 0 01 % Ophthalmic Solution; Therapy: (Recorded:15Mar2016) to Recorded   12  Metoprolol Tartrate 25 MG Oral Tablet; Therapy: (Recorded:15Mar2016) to Recorded   13  OxyCODONE HCl - 5 MG Oral Tablet; Therapy: (Recorded:15Mar2016) to Recorded   14  Oyster Shell Calcium 500 MG Oral Tablet; Therapy: (Recorded:15Mar2016) to Recorded   15  Proscar 5 MG Oral Tablet; Therapy: (Recorded:15Mar2016) to Recorded   16  Sodium Bicarbonate 650 MG Oral Tablet; Therapy: (Recorded:15Mar2016) to Recorded   17  TobraDex 0 3-0 1 % Ophthalmic Suspension; Therapy: (Recorded:15Mar2016) to Recorded   18  Vitamin D3 1000 UNIT Oral Tablet; Therapy: (Recorded:15Mar2016) to Recorded    Allergies    1  Hydrocodone-Acetaminophen CAPS   2  Morphine Sulfate TABS    Vitals  Vital Signs [Data Includes: Current Encounter]    Recorded: 23LVP0991 02:30PM   Temperature 97 6 F, Tympanic   Heart Rate 64, R Radial   Pulse Quality Regular, R Radial   Systolic 200, RUE, Supine   Diastolic 82, RUE, Supine   Weight 159 lb 0 8 oz     Physical Exam    Pulse Exam   Carotid: no bruit heard on the right and no bruit on the left  Wound #1 Assessment wound #1 Location:, sacrum, started on unknown, Care for this wound started on 3/15/2016  Wound Status: not healed  Pressure Ulcer Grade: Stage IV     Length: 10cm x Width: 11 5cm x Depth: 3 1cm   Total: 115sq cm   Wound Volume: 356 5cm3       Undermining 5 5cm from 10 o'clock to 5 o'clock      Tissue type: Subcutaneous, Granulation, Slough and Eschar   Color of Wound: Red - 30%, Yellow - 45%, Black - 15% and Pink - 20%   Exudate Amount: Large   Exudate Type: Serosangiunous   Odor: None   Wound Edges: Rolled (epibolized)   Periwound Skin Condition: Erythematous         Physician/Provider Wound #1 Exam   I agree with the nursing assessment and documentation  Sacrum with large open wound with small amount of exposed bone, moderate amount of slough on base mixed with pink tissue  SMall amount of old hematoma toward right side where it appears to have been opened  Constitutional - older white male  Ears, Nose, Mouth, and Throat - External inspection of ears and nose: Normal    Neck - Exam: Supple, symmetric, trachea midline, no masses  Pulmonary - Respiratory effort: No increased work of breathing or signs of respiratory distress  Auscultation of lungs: Clear to auscultation  Cardiovascular - Auscultation of heart: Normal rate and rhythm, normal S1 and S2, no murmurs  Abdomen - Abdomen: Non-tender, no masses  Lymphatic - Palpation of lymph nodes: No lymphadenopathy  Psychiatric - Mood and affect: Normal       [address] 1: Wound Nursing Care Plan   Impaired Tissue Integrity related to: sacrum   Knowledge Deficit Related To: sacrum   Goals   Patient will maintain skin integrity:  Patient will demonstrate and verbalize knowledge of their disease process and management:  Patient will verbalize knowledge of and demonstrate adherence to interventions to achieve optimal nutrition required for wound healing:  Wound Nursing Care Interventions:   Implement offloading measures (turn & reposition schedule/method, ortho shoes/boots, floating heels, crutches, specialty surfaces:  Teach and evaluate effectiveness of offloading measures:     Teach patient and/or family about disease process and management methods:    Evaluate effectiveness of all above measures every 4 weeks with Patient Specific CQI:    Other:  plan of care initiated 3/15/2016      Procedure      Wound #1: sacrum     Nurse Dressing Change:   Wound #1 The wound located on the sacrum  Wound care rendered as per Physician/Advanced Practitioner order/plan  Order sent with patient to facility    Comments:      Signatures   Electronically signed by : Reuben Queen MD; Mar 15 2016  5:36PM EST                       (Author)    Electronically signed by : Reuben Queen MD; Mar 21 2016  1:33PM EST                       (Author)

## 2018-01-14 NOTE — PROGRESS NOTES
Assessment    1  Decubitus ulcer of sacral region, stage 4 (707 03,707 24) (L89 154)    Plan  Decubitus ulcer of sacral region, stage 4    · Mesalt instructions given; Status:Complete;   Done: 56STH0192 04:29PM   Ordered; For:Decubitus ulcer of sacral region, stage 4; Ordered By:Romulo Simmons;  Ribbon or Sheet? : Mesalt Sheet    Wound Care Orders/Instructions    Wound Identification and Instructions   Wound #1: sacrum    Wound Care Instructions  Discussed with Patient/Caregiver  Dressing Type: Mesalt sheet  Wash with mild soap and water, normal saline, wound cleanser  Secondary dressing apply: 5 x 9, 4x4 gauze  Secure with: Tape  Dressing change frequency: Daily  Comments/Other:           Wound Goals  Wound Goals:   Healing Goals:   Fair healing potential, expect slow healing progress secondary to co-morbid conditions and advanced age  Wound base will be 25%    Patient will achieve appropriate pressure redistribution for wound prevention       Discussion/Summary    Wound is fairly clean, small portion of bone excised  Continue Mesalt  Chief Complaint  Follow up for sacrum      History of Present Illness    Wound Identification HPI   Wound #1: sacrum          The patient came to Wound Care via stretcher, patient remains on stretcher during visit  The patient is being seen for a follow-up with MD and @ [facility] at the [address]  The patient is accompanied by his staff of [facility]  The patient's identification was verified  A secondary verification process was completed  Orientation: oriented to person, oriented to place and oriented to time  Blood Glucose:  Not applicable  3/15/2016: Spouse requested patient be evaluated for sacral wound  Spouse was not present at visit today  Patient arrived with lidocaine intact to sacrum    3/22/2016: Arrived with Lidocaine intact to sacrum  3/29/2016: Arrived with lidocaine intact to sacrum   4/5/2016: Arrived with lidocaine intact to the sacrum  4/12/2016: Arrived with lidocaine intact to sacrum  History of Falling:   Secondary Diagnosis: Yes = 15   Ambulatory Aid None, Bedrest, Nurse Assist = 0   No = 0   Gait: Impaired = 20 -- Short steps with shuffle, may have difficulty arising from chair, head down, significantly impaired balance, requiring furniture, support person, or walking aid to walk  Mental Status: Overestimates, forgets limitations = 15   Total Score: 50     > 45 = High Risk       The most recent fall occurred denies any recent falls  Mobility scored as: 0 = bed or chair bound  Psychological Stress and Acute Disease Scored as: 0 = The patient has experienced psychological stress or acute disease in the last 3 months  Neuropsychological problems scored as: 1 = mild dementia  Provider Wound Care HPI: no new wound complaints         Pain Assessment   the patient states they have pain  The pain is located in the sacrum  The pain radiates to the denies  The patient describes the pain as burning  (on a scale of 0 to 10, the patient rates the pain at 10 )   Abuse And Domestic Violence Screen   Domestic violence screen not done today  Reason DV Screen not done: staff of [address] [place] [address] present    Depression And Suicide Screen  Suicide screen not done today  Reason suicide screen not done: staff of [address] [place] [address]  Prefered Language is  [place]  Primary Language is  English  Readiness To Learn: Receptive  Barriers To Learning: hard of hearing  Preferred Learning: written   Education Completed: treatment/procedure   Teaching Method: written   Person Taught: staff lc bronson    Evaluation Of Learning: verbalized/demonstrated understanding      Active Problems    1  Decubitus ulcer of sacral region, stage 4 (318 45,606 79) (V07 138)    Past Medical History    1  History of Enlarged prostate without lower urinary tract symptoms (600 00) (N40 0)   2  History of anemia (V12 3) (Z86 2)   3  History of essential hypertension (V12 59) (Z86 79)   4  History of glaucoma (V12 49) (Z86 69)   5  History of hypothyroidism (V12 29) (Z86 39)   6  History of Hypocalcemia (275 41) (E83 51)    Current Meds   1  Acetaminophen 325 MG Oral Tablet; Therapy: (Recorded:15Mar2016) to Recorded   2  Adult Aspirin EC Low Strength 81 MG Oral Tablet Delayed Release; Therapy: (Recorded:15Mar2016) to Recorded   3  Allopurinol 100 MG Oral Tablet; Therapy: (Recorded:15Mar2016) to Recorded   4  Cosopt 22 3-6 8 MG/ML Ophthalmic Solution; Therapy: (Recorded:15Mar2016) to Recorded   5  Ferrous Sulfate 325 (65 Fe) MG Oral Tablet; Therapy: (Recorded:15Mar2016) to Recorded   6  Flomax 0 4 MG CP24;   Therapy: (Recorded:15Mar2016) to Recorded   7  Gonak 2 5 % Ophthalmic Solution; Therapy: (Recorded:15Mar2016) to Recorded   8  Lasix 20 MG Oral Tablet; Therapy: (Recorded:15Mar2016) to Recorded   9  Levothyroxine Sodium 125 MCG CAPS; Therapy: (Recorded:15Mar2016) to Recorded   10  Lidocaine HCl (Local Anesth ) 4 % SOLN; apply prn to wound(s) prior to deridement for    pain control; Therapy: (Recorded:15Mar2016) to Recorded   11  Lumigan 0 01 % Ophthalmic Solution; Therapy: (Recorded:15Mar2016) to Recorded   12  Metoprolol Tartrate 25 MG Oral Tablet; Therapy: (Recorded:15Mar2016) to Recorded   13  OxyCODONE HCl - 5 MG Oral Tablet; Therapy: (Recorded:15Mar2016) to Recorded   14  Oyster Shell Calcium 500 MG Oral Tablet; Therapy: (Recorded:15Mar2016) to Recorded   15  Proscar 5 MG Oral Tablet; Therapy: (Recorded:15Mar2016) to Recorded   16  Sodium Bicarbonate 650 MG Oral Tablet; Therapy: (Recorded:15Mar2016) to Recorded   17  TobraDex 0 3-0 1 % Ophthalmic Suspension; Therapy: (Recorded:15Mar2016) to Recorded   18  Vitamin D3 1000 UNIT Oral Tablet; Therapy: (Recorded:15Mar2016) to Recorded    Allergies    1  Acetaminophen TABS   2  Hydrocodone-Acetaminophen CAPS   3   Morphine Sulfate TABS    Vitals  Vital Signs [Data Includes: Current Encounter]    Recorded: 12Apr2016 04:05PM   Temperature 96 5 F, Tympanic   Heart Rate 68, L Radial   Pulse Quality Irregular, L Radial   Respiration 20   Respiration Quality Normal   Systolic 96, RUE, Sitting   Diastolic 48, RUE, Sitting   Weight 159 lb 0 8 oz   Pain Scale 2     Physical Exam    Wound #1 Assessment wound #1 Location:, sacrum, started on unknown, Care for this wound started on 3/15/2016  Wound Status: not healed  Pressure Ulcer Grade: Stage IV  Length: 10cm x Width: 11 2cm x Depth: 2cm   Total: 112sq cm   Wound Volume: 224cm3       Undermining 4cm from 9 o'clock to 5 o'clock     deepest at 5 o clock   Tissue type: Subcutaneous, Granulation, Slough and Exposed bone   Color of Wound: Yellow - 30% and Pink - 65% 5% bone   Exudate Amount: Moderate   Exudate Type: Serosangiunous   Odor: None   Exudate Color: Yellow   Wound Edges: Rolled (epibolized)   Periwound Skin Condition: Erythematous        Physician/Provider Wound #1 Exam   I agree with the nursing assessment and documentation  Sacral wound with small area of bone exposed, slough and granulation tissue noted, periwound clean  Trg olucry 1: Wound Nursing Care Plan   Impaired Tissue Integrity related to: sacrum   Knowledge Deficit Related To: sacrum   Goals   Patient will maintain skin integrity:  Patient will demonstrate and verbalize knowledge of their disease process and management:  Patient will verbalize knowledge of and demonstrate adherence to interventions to achieve optimal nutrition required for wound healing:  Wound Nursing Care Interventions:   Implement offloading measures (turn & reposition schedule/method, ortho shoes/boots, floating heels, crutches, specialty surfaces:  Teach and evaluate effectiveness of offloading measures:     Teach patient and/or family about disease process and management methods:    Evaluate effectiveness of all above measures every 4 weeks with Patient Specific CQI:    Other:  plan of care initiated 3/15/2016, reviewed 4/12/2016      Procedure      Wound #1: sacrum     Nurse Dressing Change:   Wound #1 The wound located on the sacrum  Wound care rendered as per Physician/Advanced Practitioner order/plan  Order sent with patient to facility  Comments:    Wound Debridement Bone:   Wound was debrided to bone  Prior to the procedure, the patient was identified using two identifiers, the general consent was signed, the proper site of procedure was identified, and a time out was taken  Anesthesia: Local anesthesia with 4% topical lidocaine was utilized prior to the procedure for pain control  A curette and a rongeur was utilized to surgically excise devitalized tissue and/or slough through epidermis, dermis, subcutaneous tissue, muscle and into the bone  The total sq cm excised was 10sq cm  See wound assessment for further details  There was moderate bleeding controlled with gentle pressure  the patient tolerated the procedure well without complication  CPT Code(s)   82158 - Excisional DebridementTo Bone; first 20 sq cm        Signatures   Electronically signed by : Noah Gibson MD; Apr 12 2016  4:29PM EST                       (Author)    Electronically signed by : Noah Gibson MD; Apr 18 2016  1:14PM EST                       (Author)

## 2018-01-15 NOTE — PROGRESS NOTES
Assessment    1  Decubitus ulcer of sacral region, stage 4 (707 03,707 24) (L89 154)    Plan  Decubitus ulcer of sacral region, stage 4    · Debride Subcutaneous Tissue each additional 20 sq cm or part thereof;  Status:Complete;   Done: 11IZG1327   Perform:[facility]; [id number];[id number]; For:Decubitus ulcer of sacral region, stage 4; Ordered By:Elizabeth Whittington;   · Debride Subcutaneous Tissue first 20 sq cm or less; Status:Complete;   Done:  06ZBZ2500   Perform:[facility]; [id number];[id number]; For:Decubitus ulcer of sacral region, stage 4; Ordered By:Elizabeth Whittington; Wound Care Orders/Instructions    Wound Identification and Instructions   Wound #1: sacrum    Wound Care Instructions  Discussed with Patient/Caregiver  Wash with mild soap and water, normal saline, wound cleanser or as specified  Apply 4% Topical Lidocaine anesthetic solution PRN to wound/ulcer prior to debridement for pain control  To periwound apply: nss moisten gauze  Secondary dressing apply: 5 x 9, and tape  Dressing change frequency: Three times per week  Comments/Other:   Nss moisten kerlix applied to wound bed today  NPWT to be applied at [facility]  Skin prep to ivon wound, Adaptic, Black sponge in wound bed, right buttock steri strip or VAC drape together, bridge to hip, seal at 125mmHg, continuous  Change 3x week      Wound Goals  Wound Goals:   Healing Goals:   Fair healing potential, expect slow healing progress secondary to co-morbid conditions and advanced age  Wound base will be 25%    Patient will achieve appropriate pressure redistribution for wound prevention       Discussion/Summary    Continue with NPWT  The treatment plan was reviewed with the patient/guardian   The patient/guardian understands and agrees with the treatment plan      Chief Complaint  Follow up for sacrum      History of Present Illness    Wound Identification HPI   Wound #1: sacrum          The patient came to Wound Care via stretcher, patient remains on stretcher during visit  The patient is being seen for @ [facility] with [address] at the [address]  The patient is accompanied by his staff of [facility]  The patient's identification was verified  A secondary verification process was completed  Orientation: oriented to person, oriented to place and oriented to time  Blood Glucose:  Not applicable  3/15/2016: Spouse requested patient be evaluated for sacral wound  Spouse was not present at visit today  Patient arrived with lidocaine intact to sacrum    3/22/2016: Arrived with Lidocaine intact to sacrum  History of Falling:   Secondary Diagnosis: Yes = 15   Ambulatory Aid None, Bedrest, Nurse Assist = 0   No = 0   Gait: Impaired = 20 -- Short steps with shuffle, may have difficulty arising from chair, head down, significantly impaired balance, requiring furniture, support person, or walking aid to walk  Mental Status: Overestimates, forgets limitations = 15   Total Score: The most recent fall occurred unknown  Mobility scored as: 0 = bed or chair bound  Psychological Stress and Acute Disease Scored as: 0 = The patient has experienced psychological stress or acute disease in the last 3 months  Neuropsychological problems scored as: 1 = mild dementia  Provider Wound Care HPI: Follow up for sacral wound  NPWT is currently being used  Feels well  Review of Systems    Constitutional: no fever or chills, feels well, no tiredness, no recent weight loss or weight gain  Integumentary: as noted in HPI  ROS reviewed  Active Problems    1  Decubitus ulcer of sacral region, stage 4 (677 09,810 72) (U27 497)    Past Medical History    1  History of Enlarged prostate without lower urinary tract symptoms (600 00) (N40 0)   2  History of anemia (V12 3) (Z86 2)   3  History of essential hypertension (V12 59) (Z86 79)   4  History of glaucoma (V12 49) (Z86 69)   5  History of hypothyroidism (V12 29) (Z86 39)   6  History of Hypocalcemia (275 41) (E83 51)    The active problems and past medical history were reviewed and updated today  Surgical History    The surgical history was reviewed and updated today  Family History    The family history was reviewed and updated today  Social History  The social history was reviewed and updated today  The social history was reviewed and is unchanged  Current Meds   1  Acetaminophen 325 MG Oral Tablet; Therapy: (Recorded:15Mar2016) to Recorded   2  Adult Aspirin EC Low Strength 81 MG Oral Tablet Delayed Release; Therapy: (Recorded:15Mar2016) to Recorded   3  Allopurinol 100 MG Oral Tablet; Therapy: (Recorded:15Mar2016) to Recorded   4  Cosopt 22 3-6 8 MG/ML Ophthalmic Solution; Therapy: (Recorded:15Mar2016) to Recorded   5  Ferrous Sulfate 325 (65 Fe) MG Oral Tablet; Therapy: (Recorded:15Mar2016) to Recorded   6  Flomax 0 4 MG CP24;   Therapy: (Recorded:15Mar2016) to Recorded   7  Gonak 2 5 % Ophthalmic Solution; Therapy: (Recorded:15Mar2016) to Recorded   8  Lasix 20 MG Oral Tablet; Therapy: (Recorded:15Mar2016) to Recorded   9  Levothyroxine Sodium 125 MCG CAPS; Therapy: (Recorded:15Mar2016) to Recorded   10  Lidocaine HCl (Local Anesth ) 4 % SOLN; apply prn to wound(s) prior to deridement for    pain control; Therapy: (Recorded:15Mar2016) to Recorded   11  Lumigan 0 01 % Ophthalmic Solution; Therapy: (Recorded:15Mar2016) to Recorded   12  Metoprolol Tartrate 25 MG Oral Tablet; Therapy: (Recorded:15Mar2016) to Recorded   13  OxyCODONE HCl - 5 MG Oral Tablet; Therapy: (Recorded:15Mar2016) to Recorded   14  Oyster Shell Calcium 500 MG Oral Tablet; Therapy: (Recorded:15Mar2016) to Recorded   15  Proscar 5 MG Oral Tablet; Therapy: (Recorded:15Mar2016) to Recorded   16  Sodium Bicarbonate 650 MG Oral Tablet; Therapy: (Recorded:15Mar2016) to Recorded   17   TobraDex 0 3-0 1 % Ophthalmic Suspension; Therapy: (Recorded:15Mar2016) to Recorded   18  Vitamin D3 1000 UNIT Oral Tablet; Therapy: (Recorded:15Mar2016) to Recorded    The medication list was reviewed and updated today  Allergies    1  Acetaminophen TABS   2  Hydrocodone-Acetaminophen CAPS   3  Morphine Sulfate TABS    Vitals  Vital Signs [Data Includes: Current Encounter]    Recorded: 65MIK5081 02:14PM   Temperature 98 7 F, Tympanic   Heart Rate 64, R Radial   Pulse Quality Regular, R Radial   Respiration 20   Respiration Quality Normal   Systolic 610, RUE, Sitting   Diastolic 70, RUE, Sitting     Physical Exam    Wound #1 Assessment wound #1 Location:, sacrum, started on unknown, Care for this wound started on 3/15/2016  Wound Status: not healed  Pressure Ulcer Grade: Stage IV  Length: 10cm x Width: 13cm x Depth: 3 5cm   Total: 130sq cm   Wound Volume: 455cm3       Undermining 5cm from 10 o'clock to 5 o'clock     deepest at 3 o clock   Tissue type: Subcutaneous, Granulation, Slough, Eschar and Exposed bone   Color of Wound: Red - 10%, Yellow - 40%, Black - 10% and Pink - 30% 10% bone   Exudate Amount: Moderate   Exudate Type: Serosangiunous   Odor: None   Wound Edges: Rolled (epibolized)   Periwound Skin Condition: Erythematous, blanchable erythema        Physician/Provider Wound #1 Exam   I agree with the nursing assessment and documentation  Judy Saunders 1: Wound Nursing Care Plan   Impaired Tissue Integrity related to: sacrum   Knowledge Deficit Related To: sacrum   Goals   Patient will maintain skin integrity:  Patient will demonstrate and verbalize knowledge of their disease process and management:  Patient will verbalize knowledge of and demonstrate adherence to interventions to achieve optimal nutrition required for wound healing:     Wound Nursing Care Interventions:   Implement offloading measures (turn & reposition schedule/method, ortho shoes/boots, floating heels, crutches, specialty surfaces:  Teach and evaluate effectiveness of offloading measures:  Teach patient and/or family about disease process and management methods:    Evaluate effectiveness of all above measures every 4 weeks with Patient Specific CQI:    Other:  plan of care initiated 3/15/2016      Procedure      Wound #1: sacrum     Nurse Dressing Change:   Wound #1 The wound located on the sacrum  Wound care rendered as per Physician/Advanced Practitioner order/plan  Order sent with patient to facility  Comments:    Excisional Debridement Subcutaneous Tissue:   Wound was excisionally debrided to subcutaneous tissue as follows  Prior to the procedure, the patient was identified using two identifiers, the general consent was signed, the proper site of procedure was identified, and a time out was taken  Anesthesia: Local anesthesia with 4% topical lidocaine was utilized prior to the procedure for pain control  A curette, #10 surgical blade and forceps was utilized to surgically excise devitalized tissue and/or slough through epidermis, dermis and into the subcutaneous tissue  The total sq cm excised was 100sq cm  There was moderate bleeding controlled with gentle pressure  the patient tolerated the procedure well without complication  CPT Code(s)   U7331118 - Excisional DebridementTo Subcutaneous Tissue; first 20 sq cm   28942 - Each Additional 20 sq cm - Excisional DebridementTo Subcutaneous Tissue  Attending Note  Collaborating Note: I agree with the Advanced Practitioner note   I discussed the case with the Advanced Practitioner and reviewed the AP note      Signatures   Electronically signed by : DIANE Acosta-CCRNPANP-C,CESAR; Mar 24 2016  9:54AM EST                       (Author)    Electronically signed by : Cirilo Ocasio MD; Mar 28 2016  7:28AM EST                       (Author)

## 2018-01-16 NOTE — MISCELLANEOUS
Physical Exam    Wound #1 Assessment wound #1 Location:, sacrum, started on unknown, Care for this wound started on 3/15/2016  Wound Status: not healed  Pressure Ulcer Grade: Stage IV  Length: 12cm x Width: 11 3cm x Depth: 2 6cm   Total: 135 6sq cm   Wound Volume: 352 56cm3       Undermining 4 5cm from 9 o'clock to 5 o'clock     deepest at 5 o clock   Tissue type: Subcutaneous, Granulation, Slough, Eschar and Exposed bone   Color of Wound: Red - 20%, Yellow - 55%, Black - 5% and Pink - 15% 5% bone   Exudate Amount: Moderate   Exudate Type: Serosangiunous and thick   Odor: None   Exudate Color: Yellow   Wound Edges: Rolled (epibolized)   Periwound Skin Condition: Erythematous, pale pink         Physician/Provider Wound #1 Exam   I agree with the nursing assessment and documentation  Sacral wound with small area of bone exposed as well as fascia  moderate amount of slough on base of wound  pink tissue present, periwound clean  Wound Drsg  Orders/Instructions  Wound Identification Dressing Orders--Instructions:   Wound Identification and Instructions   Wound #1: sacrum    Wound Care Instructions  Discussed with Patient/Caregiver  Dressing Type: Mesalt sheet  Wash with mild soap and water, normal saline, wound cleanser or as specified  Apply 4% Topical Lidocaine anesthetic solution PRN to wound/ulcer prior to debridement for pain control  Secondary dressing apply: 5 x 9, and tape  Dressing change frequency: Daily  Comments/Other:           Wound Goals  Wound Goals:   Healing Goals:   Fair healing potential, expect slow healing progress secondary to co-morbid conditions and advanced age  Wound base will be 25%    Patient will achieve appropriate pressure redistribution for wound prevention       Judy Saunders 1:    Wound Nursing Care Plan   Impaired Tissue Integrity related to: sacrum   Knowledge Deficit Related To: sacrum   Goals   Patient will maintain skin integrity:  Patient will demonstrate and verbalize knowledge of their disease process and management:  Patient will verbalize knowledge of and demonstrate adherence to interventions to achieve optimal nutrition required for wound healing:  Wound Nursing Care Interventions:   Implement offloading measures (turn & reposition schedule/method, ortho shoes/boots, floating heels, crutches, specialty surfaces:  Teach and evaluate effectiveness of offloading measures:     Teach patient and/or family about disease process and management methods:    Evaluate effectiveness of all above measures every 4 weeks with Patient Specific CQI:    Other:  plan of care initiated 3/15/2016      Signatures   Electronically signed by : Elmer Reyes MD; Mar 29 2016  4:34PM EST                       (Author)    Electronically signed by : Elmer Reyes MD; Mar 31 2016  7:23AM EST                       (Author)

## 2018-01-16 NOTE — PROGRESS NOTES
Assessment    1  Decubitus ulcer of sacral region, stage 4 (707 03,707 24) (L89 154)    Plan  Decubitus ulcer of sacral region, stage 4    · Mesalt instructions given; Status:Complete;   Done: [id number] 03:45PM   Ordered; For:Decubitus ulcer of sacral region, stage 4; Ordered By:Romulo Simmons;  Ribbon or Sheet? : Mesalt Sheet    Wound Care Orders/Instructions    Wound Identification and Instructions   Wound #1: sacrum    Wound Care Instructions  Discussed with Patient/Caregiver  Dressing Type: Mesalt sheet  Secondary dressing apply: 5 x 9, and tape  Dressing change frequency: Daily  Comments/Other:           Wound Goals  Wound Goals:   Healing Goals:   Fair healing potential, expect slow healing progress secondary to co-morbid conditions and advanced age  Wound base will be 25%    Patient will achieve appropriate pressure redistribution for wound prevention       Discussion/Summary    Wound is slowly improving and looks moist enough to continue Mesalt packing  Chief Complaint  Follow up for sacrum      History of Present Illness    Wound Identification HPI   Wound #1: sacrum          The patient came to Wound Care via stretcher, patient remains on stretcher during visit  The patient is being seen for a follow-up with MD and @ [facility] at the [facility]  The patient is accompanied by his staff of [facility]  The patient's identification was verified  A secondary verification process was completed  Orientation: oriented to person, oriented to place and oriented to time  Blood Glucose:  Not applicable  3/15/2016: Spouse requested patient be evaluated for sacral wound  Spouse was not present at visit today  Patient arrived with lidocaine intact to sacrum    3/22/2016: Arrived with Lidocaine intact to sacrum  3/29/2016: Arrived with lidocaine intact to sacrum  4/5/2016: Arrived with lidocaine intact to the sacrum             History of Falling:   Secondary Diagnosis: Yes = 15   Ambulatory Aid None, Bedrest, Nurse Assist = 0   No = 0   Gait: Impaired = 20 -- Short steps with shuffle, may have difficulty arising from chair, head down, significantly impaired balance, requiring furniture, support person, or walking aid to walk  Mental Status: Overestimates, forgets limitations = 15   Total Score: The most recent fall occurred unknown  Mobility scored as: 0 = bed or chair bound  Psychological Stress and Acute Disease Scored as: 0 = The patient has experienced psychological stress or acute disease in the last 3 months  Neuropsychological problems scored as: 1 = mild dementia  Provider Wound Care HPI: no new wound complaints         Pain Assessment   the patient states they have pain  The pain is located in the sacrum  The pain radiates to the denies  The patient describes the pain as burning  (on a scale of 0 to 10, the patient rates the pain at 10 )   Abuse And Domestic Violence Screen   Domestic violence screen not done today  Reason DV Screen not done: staff of [facility] present    Depression And Suicide Screen  Suicide screen not done today  Reason suicide screen not done: staff of [facility]  Prefered Language is  [place]  Primary Language is  English  Readiness To Learn: Receptive  Barriers To Learning: hard of hearing  Preferred Learning: written   Education Completed: treatment/procedure   Teaching Method: written   Person Taught: staff lc bronson    Evaluation Of Learning: verbalized/demonstrated understanding      Active Problems    1  Decubitus ulcer of sacral region, stage 4 (217 69,938 58) (J34 230)    Past Medical History    1  History of Enlarged prostate without lower urinary tract symptoms (600 00) (N40 0)   2  History of anemia (V12 3) (Z86 2)   3  History of essential hypertension (V12 59) (Z86 79)   4  History of glaucoma (V12 49) (Z86 69)   5  History of hypothyroidism (V12 29) (Z86 39)   6   History of Hypocalcemia (275 41) (E83 51)    Current Meds   1  Acetaminophen 325 MG Oral Tablet; Therapy: (Recorded:15Mar2016) to Recorded   2  Adult Aspirin EC Low Strength 81 MG Oral Tablet Delayed Release; Therapy: (Recorded:15Mar2016) to Recorded   3  Allopurinol 100 MG Oral Tablet; Therapy: (Recorded:15Mar2016) to Recorded   4  Cosopt 22 3-6 8 MG/ML Ophthalmic Solution; Therapy: (Recorded:15Mar2016) to Recorded   5  Ferrous Sulfate 325 (65 Fe) MG Oral Tablet; Therapy: (Recorded:15Mar2016) to Recorded   6  Flomax 0 4 MG CP24;   Therapy: (Recorded:15Mar2016) to Recorded   7  Gonak 2 5 % Ophthalmic Solution; Therapy: (Recorded:15Mar2016) to Recorded   8  Lasix 20 MG Oral Tablet; Therapy: (Recorded:15Mar2016) to Recorded   9  Levothyroxine Sodium 125 MCG CAPS; Therapy: (Recorded:15Mar2016) to Recorded   10  Lidocaine HCl (Local Anesth ) 4 % SOLN; apply prn to wound(s) prior to deridement for    pain control; Therapy: (Recorded:15Mar2016) to Recorded   11  Lumigan 0 01 % Ophthalmic Solution; Therapy: (Recorded:15Mar2016) to Recorded   12  Metoprolol Tartrate 25 MG Oral Tablet; Therapy: (Recorded:15Mar2016) to Recorded   13  OxyCODONE HCl - 5 MG Oral Tablet; Therapy: (Recorded:15Mar2016) to Recorded   14  Oyster Shell Calcium 500 MG Oral Tablet; Therapy: (Recorded:15Mar2016) to Recorded   15  Proscar 5 MG Oral Tablet; Therapy: (Recorded:15Mar2016) to Recorded   16  Sodium Bicarbonate 650 MG Oral Tablet; Therapy: (Recorded:15Mar2016) to Recorded   17  TobraDex 0 3-0 1 % Ophthalmic Suspension; Therapy: (Recorded:15Mar2016) to Recorded   18  Vitamin D3 1000 UNIT Oral Tablet; Therapy: (Recorded:15Mar2016) to Recorded    Allergies    1  Acetaminophen TABS   2  Hydrocodone-Acetaminophen CAPS   3   Morphine Sulfate TABS    Vitals  Vital Signs [Data Includes: Current Encounter]    Recorded: [id number] 03:30PM   Temperature 98 1 F, Tympanic   Heart Rate 88, R Radial   Pulse Quality Regular, R Radial   Respiration 20   Respiration Quality Normal   Systolic 125, RUE, Supine   Diastolic 70, RUE, Supine   Pain Scale 10     Physical Exam    Wound #1 Assessment wound #1 Location:, sacrum, started on unknown, Care for this wound started on 3/15/2016  Wound Status: not healed  Pressure Ulcer Grade: Stage IV  Length: 9cm x Width: 10cm x Depth: 3 5cm   Total: 90sq cm   Wound Volume: 315cm3       Undermining 5 2cm from 9 o'clock to 5 o'clock     deepest at 5 o clock   Tissue type: Subcutaneous, Granulation, Slough, Eschar and Exposed bone   Color of Wound: Yellow - 60%, Black - 5% and Pink - 14% 1% bone, dark red 25%   Exudate Amount: Moderate   Exudate Type: Serosangiunous   Odor: None   Exudate Color: Yellow   Wound Edges: Rolled (epibolized)   Periwound Skin Condition: Erythematous, pale pink        Physician/Provider Wound #1 Exam   I agree with the nursing assessment and documentation  Sacral wound mostly pink and granular, fascia present on base with yellow slough, small area of exposed bone, edges beginning to roll, periwound clean  [address]: Wound Nursing Care Plan   Impaired Tissue Integrity related to: sacrum   Knowledge Deficit Related To: sacrum   Goals   Patient will maintain skin integrity:  Patient will demonstrate and verbalize knowledge of their disease process and management:  Patient will verbalize knowledge of and demonstrate adherence to interventions to achieve optimal nutrition required for wound healing:  Wound Nursing Care Interventions:   Implement offloading measures (turn & reposition schedule/method, ortho shoes/boots, floating heels, crutches, specialty surfaces:  Teach and evaluate effectiveness of offloading measures:     Teach patient and/or family about disease process and management methods:    Evaluate effectiveness of all above measures every 4 weeks with Patient Specific CQI:    Other:  plan of care initiated 3/15/2016      Procedure      Wound #1: sacrum     Nurse Dressing Change:   Wound #1 The wound located on the sacrum  Wound care rendered as per Physician/Advanced Practitioner order/plan  Order sent with patient to facility  Comments:    Excisional Debridement Subcutaneous Tissue:   Wound was excisionally debrided to subcutaneous tissue as follows  Prior to the procedure, the patient was identified using two identifiers, the general consent was signed, the proper site of procedure was identified, and a time out was taken  Anesthesia: Local anesthesia with 4% topical lidocaine was utilized prior to the procedure for pain control  A curette was utilized to surgically excise devitalized tissue and/or slough through epidermis, dermis and into the subcutaneous tissue  The total sq cm excised was 20sq cm  See wound assessment for further details  There was minimal bleeding controlled with gentle pressure  the patient tolerated the procedure well without complication  CPT Code(s)   [id number] - Excisional DebridementTo Subcutaneous Tissue; first 20 sq cm        Signatures   Electronically signed by : Alma Delia Gutierrez MD; Apr 5 2016  3:46PM EST                       (Author)    Electronically signed by : Alma Delia Gutierrez MD; Apr 6 2016  4:12PM EST                       (Author)

## 2018-01-16 NOTE — PROGRESS NOTES
Assessment    1  Decubitus ulcer of sacral region, stage 4 (707 03,707 24) (L89 154)    Plan  Decubitus ulcer of sacral region, stage 4    · Other Wound Care Instructions; Status:Complete;   Done: [id number] 03:57PM   Ordered; For:Decubitus ulcer of sacral region, stage 4; Ordered By:Romulo Simmons;  Instruction : wet to dry    Wound Care Orders/Instructions    Wound Identification and Instructions   Wound #1: sacrum    Wound Care Instructions  Discussed with Patient/Caregiver  Wash with mild soap and water, normal saline, wound cleanser  Secondary dressing apply: 5 x 9, 4x4 gauze  Secure with: Tape  Dressing change frequency: Daily, prn dislodgement/soilage  Comments/Other:   nss moisten gauze, 4x4, 5x9, tape  Change daily and prn dislodgement/soilage      Wound Goals  Wound Goals:   Healing Goals:   Fair healing potential, expect slow healing progress secondary to co-morbid conditions and advanced age  Wound depth will decrease by 25%   Patient will achieve appropriate pressure redistribution for wound prevention       Discussion/Summary    Wound is fairly clean, measures smaller  Will continue wet to dry dressings  Chief Complaint  Follow up for sacrum      History of Present Illness    Wound Identification HPI   Wound #1: sacrum          The patient came to Wound Care via wheelchair, assist x2 to exam chair  The patient is being seen for a follow-up with MD and @ [facility] at the [facility]  The patient is accompanied by his staff of [facility]  The patient's identification was verified  A secondary verification process was completed  Orientation: oriented to person, oriented to place and oriented to time  Blood Glucose:  Not applicable  3/15/2016: Spouse requested patient be evaluated for sacral wound  Spouse was not present at visit today  Patient arrived with lidocaine intact to sacrum    3/22/2016: Arrived with Lidocaine intact to sacrum   3/29/2016: Arrived with lidocaine intact to sacrum  4/5/2016: Arrived with lidocaine intact to the sacrum  4/12/2016: Arrived with lidocaine intact to sacrum  4/19/2016:Arrived with lidocaine intact to sacrum  4/26/2016: Arrived with lidocaine intact to sacrum  5/3/2016: Arrived with nss moisten gauze, 5x9, tape intact to sacrum  5/10/2016: Arrived with lidocaine intact to sacrum  SDTI to ivon wound  5/17/2016: Arrived with nss moisten gauze stool covered  Incontinent of stool  Stool found in wound bed  5/24/2016: Arrived with lidocaine intact to sacrum  History of Falling: Yes = 25   Secondary Diagnosis: Yes = 15   Ambulatory Aid None, Bedrest, Nurse Assist = 0   No = 0   Gait: Impaired = 20 -- Short steps with shuffle, may have difficulty arising from chair, head down, significantly impaired balance, requiring furniture, support person, or walking aid to walk  Mental Status: Overestimates, forgets limitations = 15   Total Score: 75     > 45 = High Risk       The most recent fall occurred 5/2016 and denies any recent falls  Nutrition Assessment Screening: Food intake over the last 3 months due to the loss of appetite, digestive problems, chewing or swallowing difficulties is graded as: 2 = no decrease in food intake   Mobility scored as: 0 = bed or chair bound  Psychological Stress and Acute Disease Scored as: 0 = The patient has experienced psychological stress or acute disease in the last 3 months  Neuropsychological problems scored as: 1 = mild dementia  Provider Wound Care HPI: no new wound complaints         Pain Assessment   the patient states they have pain  The pain is located in the sacrum  The pain radiates to the denies  The patient describes the pain as burning and stinging  (on a scale of 0 to 10, the patient rates the pain at 8 )   Abuse And Domestic Violence Screen   Domestic violence screen not done today   Reason DV Screen not done: staff of [address] [place] [address] present    Depression And Suicide Screen  Suicide screen not done today  Reason suicide screen not done: staff of [facility]  Prefered Language is  [place]  Primary Language is  English  Readiness To Learn: Receptive  Barriers To Learning: hard of hearing  Preferred Learning: written   Education Completed: treatment/procedure   Teaching Method: written   Person Taught: staff of [facility]    Evaluation Of Learning: verbalized/demonstrated understanding and needs reinforcement      Active Problems    1  Decubitus ulcer of sacral region, stage 4 (617 57,312 02) (M86 403)    Past Medical History    1  History of Enlarged prostate without lower urinary tract symptoms (600 00) (N40 0)   2  History of anemia (V12 3) (Z86 2)   3  History of essential hypertension (V12 59) (Z86 79)   4  History of glaucoma (V12 49) (Z86 69)   5  History of hypothyroidism (V12 29) (Z86 39)   6  History of Hypocalcemia (275 41) (E83 51)    Current Meds   1  Acetaminophen 325 MG Oral Tablet; Therapy: (Recorded:15Mar2016) to Recorded   2  Adult Aspirin EC Low Strength 81 MG Oral Tablet Delayed Release; Therapy: (Recorded:15Mar2016) to Recorded   3  Allopurinol 100 MG Oral Tablet; Therapy: (Recorded:15Mar2016) to Recorded   4  Cosopt 22 3-6 8 MG/ML Ophthalmic Solution; Therapy: (Recorded:15Mar2016) to Recorded   5  Ferrous Sulfate 325 (65 Fe) MG Oral Tablet; Therapy: (Recorded:15Mar2016) to Recorded   6  Flomax 0 4 MG CP24;   Therapy: (Recorded:15Mar2016) to Recorded   7  Gonak 2 5 % Ophthalmic Solution; Therapy: (Recorded:15Mar2016) to Recorded   8  Lasix 20 MG Oral Tablet; Therapy: (Recorded:15Mar2016) to Recorded   9  Levothyroxine Sodium 125 MCG CAPS; Therapy: (Recorded:15Mar2016) to Recorded   10  Lidocaine HCl (Local Anesth ) 4 % SOLN; apply prn to wound(s) prior to deridement for    pain control; Therapy: (Recorded:15Mar2016) to Recorded   11  Lumigan 0 01 % Ophthalmic Solution; Therapy: (Recorded:15Mar2016) to Recorded   12  Metoprolol Tartrate 25 MG Oral Tablet; Therapy: (Recorded:15Mar2016) to Recorded   13  OxyCODONE HCl - 5 MG Oral Tablet; Therapy: (Recorded:15Mar2016) to Recorded   14  Oyster Shell Calcium 500 MG Oral Tablet; Therapy: (Recorded:15Mar2016) to Recorded   15  Proscar 5 MG Oral Tablet; Therapy: (Recorded:15Mar2016) to Recorded   16  Sodium Bicarbonate 650 MG Oral Tablet; Therapy: (Recorded:15Mar2016) to Recorded   17  TobraDex 0 3-0 1 % Ophthalmic Suspension; Therapy: (Recorded:15Mar2016) to Recorded   18  Vitamin D3 1000 UNIT Oral Tablet; Therapy: (Recorded:15Mar2016) to Recorded    Allergies    1  Acetaminophen TABS   2  Hydrocodone-Acetaminophen CAPS   3  Morphine Sulfate TABS    Vitals  Vital Signs [Data Includes: Current Encounter]    Recorded: [id number] 03:40PM   Temperature 98 2 F, Tympanic   Heart Rate 74, R Radial   Pulse Quality Regular, R Radial   Respiration 18   Respiration Quality Normal   Systolic 838, RUE, Sitting   Diastolic 80, RUE, Sitting   Height 5 ft 8 in   Pain Scale 8     Physical Exam    Wound #1 Assessment wound #1 Location:, sacrum, started on unknown, Care for this wound started on 3/15/2016  Wound Status: not healed  Pressure Ulcer Grade: Stage IV  Length: 9cm x Width: 6 5cm x Depth: 2 6cm   Total: 58 5sq cm   Wound Volume: 152 1cm3       Undermining 2 9cm from 8 o'clock to 4 o'clock     deepest at 4 o clock   Tissue type: Subcutaneous, Granulation and Slough   Color of Wound: Yellow - 50% and Pink - 50%   Exudate Amount: Moderate   Exudate Type: Serosangiunous   Odor: None   Exudate Color: Yellow   Wound Edges: Rolled (epibolized)   Periwound Skin Condition: Induration        Physician/Provider Wound #1 Exam   I agree with the nursing assessment and documentation  Sacrum with rolled edges, pink tissue on base, periwound intact  Judy Saunders 1:    Wound Nursing Care Plan   Impaired Tissue Integrity related to: sacrum Knowledge Deficit Related To: sacrum   Goals   Patient will maintain skin integrity:  Patient will demonstrate and verbalize knowledge of their disease process and management:  Patient will verbalize knowledge of and demonstrate adherence to interventions to achieve optimal nutrition required for wound healing:  Wound Nursing Care Interventions:   Implement offloading measures (turn & reposition schedule/method, ortho shoes/boots, floating heels, crutches, specialty surfaces:  Teach and evaluate effectiveness of offloading measures:  Teach patient and/or family about disease process and management methods:    Evaluate effectiveness of all above measures every 4 weeks with Patient Specific CQI:    Other:  plan of care initiated 3/15/2016, reviewed 4/12/2016, reviewed 5/3/2016      Procedure      Wound #1: sacrum     Nurse Dressing Change:   Wound #1 The wound located on the sacrum  Wound care rendered as per Physician/Advanced Practitioner order/plan  Order sent with patient to facility  Comments:    Excisional Debridement Subcutaneous Tissue:   Wound was excisionally debrided to subcutaneous tissue as follows  Prior to the procedure, the patient was identified using two identifiers, the general consent was signed, the proper site of procedure was identified, and a time out was taken  Anesthesia: Local anesthesia with 4% topical lidocaine was utilized prior to the procedure for pain control  A curette and forceps was utilized to surgically excise devitalized tissue and/or slough through epidermis, dermis and into the subcutaneous tissue  The total sq cm excised was 5sq cm  See wound assessment for further details  There was moderate bleeding controlled with gentle pressure and controlled with silver nitrate  the patient tolerated the procedure well without complication  CPT Code(s)   [id number] - Excisional DebridementTo Subcutaneous Tissue; first 20 sq cm        Signatures   Electronically signed by : Sunday Geronimo MD; May 24 2016  3:57PM EST                       (Author)    Electronically signed by : Sunday Geronimo MD; John  3 2016  4:24PM EST                       (Author)

## 2018-01-16 NOTE — MISCELLANEOUS
Physical Exam    Wound #1 Assessment wound #1 Location:, sacrum, started on unknown, Care for this wound started on 3/15/2016  Wound Status: not healed  Pressure Ulcer Grade: Stage IV  Length: 9 5cm x Width: 9 9cm x Depth: 2 2cm   Total: 94 05sq cm   Wound Volume: 206 91cm3       Undermining 3 7cm from 9 o'clock to 4 o'clock     deepest at 4 o clock   Tissue type: Subcutaneous, Granulation and Slough   Color of Wound: Yellow - 40% and Pink - 60%   Exudate Amount: Moderate   Exudate Type: Serosangiunous   Odor: None   Exudate Color: Yellow   Wound Edges: Rolled (epibolized)   Periwound Skin Condition: Erythematous        Physician/Provider Wound #1 Exam   I agree with the nursing assessment and documentation  Wound Drsg  Orders/Instructions  Wound Identification Dressing Orders--Instructions:   Wound Identification and Instructions   Wound #1: sacrum    Wound Care Instructions  Discussed with Patient/Caregiver  Wash with mild soap and water, normal saline, wound cleanser  Secondary dressing apply: 5 x 9, 4x4 gauze  Secure with: Tape  Dressing change frequency: Daily, prn dislodgement/soilage  Comments/Other:   nss moisten gauze, 4x4, 5x9, tape  Change daily and prn dislodgement/soilage      Wound Goals  Wound Goals:   Healing Goals:   Fair healing potential, expect slow healing progress secondary to co-morbid conditions and advanced age  Wound edges will appear with evidence of contraction and epithelialization   Patient will achieve appropriate pressure redistribution for wound prevention       Judy Saunders 1: Wound Nursing Care Plan   Impaired Tissue Integrity related to: sacrum   Knowledge Deficit Related To: sacrum   Goals   Patient will maintain skin integrity:  Patient will demonstrate and verbalize knowledge of their disease process and management:     Patient will verbalize knowledge of and demonstrate adherence to interventions to achieve optimal nutrition required for wound healing:  Wound Nursing Care Interventions:   Implement offloading measures (turn & reposition schedule/method, ortho shoes/boots, floating heels, crutches, specialty surfaces:  Teach and evaluate effectiveness of offloading measures:  Teach patient and/or family about disease process and management methods:    Evaluate effectiveness of all above measures every 4 weeks with Patient Specific CQI:    Other:  plan of care initiated 3/15/2016, reviewed 4/12/2016      Signatures   Electronically signed by : DIANE Martinez-CESAR CHUA;  Apr 29 2016 10:17AM EST                       (Author)    Electronically signed by : Sophia Cheung MD; Apr 29 2016  1:14PM EST                       (Author)

## 2018-01-17 NOTE — MISCELLANEOUS
Physical Exam    Wound #1 Assessment wound #1 Location:, sacrum, started on unknown, Care for this wound started on 3/15/2016  Wound Status: not healed  Pressure Ulcer Grade: Stage IV  Length: 10cm x Width: 10cm x Depth: 3cm   Total: 100sq cm   Wound Volume: 300cm3       Undermining 2cm from 9 o'clock to 4 o'clock     deepest at 5 o clock   Tissue type: Subcutaneous, Granulation, Slough and Exposed bone   Color of Wound: Yellow - 30% and Pink - 65% 5% bone   Exudate Amount: Moderate   Exudate Type: Serosangiunous   Odor: None   Exudate Color: Yellow   Wound Edges: Rolled (epibolized)   Periwound Skin Condition: Erythematous         Physician/Provider Wound #1 Exam   I agree with the nursing assessment and documentation  Sacral wound with some of edge rolling, good granulation tissue in wound, periwound clean  Wound Drsg  Orders/Instructions  Wound Identification Dressing Orders--Instructions:   Wound Identification and Instructions   Wound #1: sacrum    Wound Care Instructions  Discussed with Patient/Caregiver  Dressing Type: [place] sheet  Wash with mild soap and water, normal saline, wound cleanser  Secondary dressing apply: 5 x 9, 4x4 gauze  Secure with: Tape  Dressing change frequency: Daily, prn dislodgement/soilage  Comments/Other:           Wound Goals  Wound Goals:   Healing Goals:   Fair healing potential, expect slow healing progress secondary to co-morbid conditions and advanced age  Wound depth will decrease by 25%   Patient will achieve appropriate pressure redistribution for wound prevention       Judy Saunders 1: Wound Nursing Care Plan   Impaired Tissue Integrity related to: sacrum   Knowledge Deficit Related To: sacrum   Goals   Patient will maintain skin integrity:  Patient will demonstrate and verbalize knowledge of their disease process and management:     Patient will verbalize knowledge of and demonstrate adherence to interventions to achieve optimal nutrition required for wound healing:  Wound Nursing Care Interventions:   Implement offloading measures (turn & reposition schedule/method, ortho shoes/boots, floating heels, crutches, specialty surfaces:  Teach and evaluate effectiveness of offloading measures:     Teach patient and/or family about disease process and management methods:    Evaluate effectiveness of all above measures every 4 weeks with Patient Specific CQI:    Other:  plan of care initiated 3/15/2016, reviewed 4/12/2016      Signatures   Electronically signed by : Mat Epperson MD; Apr 19 2016  2:34PM EST                       (Author)    Electronically signed by : Mat Epperson MD; Apr 25 2016  9:11AM EST                       (Author)

## 2018-01-18 NOTE — MISCELLANEOUS
Physical Exam    Wound #1 Assessment wound #1 Location:, sacrum, started on unknown, Care for this wound started on 3/15/2016  Wound Status: not healed  Pressure Ulcer Grade: Stage IV  Length: 8 8cm x Width: 6 3cm x Depth: 1 7cm   Total: 55 44sq cm   Wound Volume: 94 248cm3       Undermining 2 1cm from 7 o'clock to 4 o'clock     deepest at 4 o clock   Tissue type: Subcutaneous, Granulation and Slough   Color of Wound: Red - 60%, Yellow - 40% and Pink - 50%   Exudate Amount: Moderate   Exudate Type: Serosangiunous   Odor: None   Wound Edges: Rolled (epibolized)   Periwound Skin Condition: Macerated, Induration        Physician/Provider Wound #1 Exam   I agree with the nursing assessment and documentation  Sacral wound with rolled edges, min maceration of periwound at 6 o'clock otherwise pink tissue in wound, periwound clean  Wound Drsg  Orders/Instructions  Wound Identification Dressing Orders--Instructions:   Wound Identification and Instructions   Wound #1: sacrum    Wound Care Instructions  Discussed with Patient/Caregiver  Dressing Type: Plain gauze  Wash with mild soap and water, normal saline, wound cleanser  Apply 4% Topical Lidocaine anesthetic solution PRN to wound/ulcer prior to debridement for pain control  Apply specified dressing to wound base/bed  Secondary dressing apply: 5 x 9  Secure with: Tape  Dressing change frequency: Daily, and prn soilage/dislodgement  Comments/Other:   NSS wet to day      Wound Goals  Wound Goals:   Healing Goals:   Fair healing potential, expect slow healing progress secondary to co-morbid conditions and advanced age  Wound depth will decrease by 25%   Patient will achieve appropriate pressure redistribution for wound prevention       Judy Saunders 1: Wound Nursing Care Plan   Impaired Tissue Integrity related to: sacrum   Knowledge Deficit Related To: sacrum   Goals   Patient will maintain skin integrity:  Patient will demonstrate and verbalize knowledge of their disease process and management:  Patient will verbalize knowledge of and demonstrate adherence to interventions to achieve optimal nutrition required for wound healing:  Wound Nursing Care Interventions:   Implement offloading measures (turn & reposition schedule/method, ortho shoes/boots, floating heels, crutches, specialty surfaces:  Teach and evaluate effectiveness of offloading measures:     Teach patient and/or family about disease process and management methods:    Evaluate effectiveness of all above measures every 4 weeks with Patient Specific CQI:    Other:  plan of care initiated 3/15/2016, reviewed 4/12/2016, reviewed 5/3/2016      Signatures   Electronically signed by : Charity Saavedra MD; May 31 2016  2:14PM EST                       (Author)    Electronically signed by : Charity Saavedra MD; Jun 1 2016  9:59AM EST                       (Author)

## 2018-01-18 NOTE — PROGRESS NOTES
Assessment    1  Decubitus ulcer of sacral region, stage 4 (707 03,707 24) (L89 [id number])    Plan  Decubitus ulcer of sacral region, stage 4    · Other Wound Care Instructions; Status:Complete;   Done: [id number] 02:14PM   Ordered; For:Decubitus ulcer of sacral region, stage 4; Ordered By:Romulo Simmons;  Instruction : wet to dry    Wound Care Orders/Instructions    Wound Identification and Instructions   Wound #1: sacrum    Wound Care Instructions  Discussed with Patient/Caregiver  Wash with mild soap and water, normal saline, wound cleanser  Secondary dressing apply: 5 x 9, 4x4 gauze  Secure with: Tape  Dressing change frequency: Daily, prn dislodgement/soilage  Comments/Other:   nss moisten gauze, 4x4, 5x9, tape  Change daily and prn dislodgement/soilage      Wound Goals  Wound Goals:   Healing Goals:   Fair healing potential, expect slow healing progress secondary to co-morbid conditions and advanced age  Wound depth will decrease by 25%   Patient will achieve appropriate pressure redistribution for wound prevention       Discussion/Summary    Sacral wound is slowly improving  Will continue wet to dry because of frequent stooling  Chief Complaint  Follow up for sacrum      History of Present Illness    Wound Identification HPI   Wound #1: sacrum          The patient came to Wound Care via wheelchair, assist x2 to exam chair  The patient is being seen for a follow-up with MD and @ [facility] at the [facility]  The patient is accompanied by his staff of [facility]  The patient's identification was verified  A secondary verification process was completed  Orientation: oriented to person, oriented to place and oriented to time  Blood Glucose:  Not applicable  3/15/2016: Spouse requested patient be evaluated for sacral wound  Spouse was not present at visit today  Patient arrived with lidocaine intact to sacrum    3/22/2016: Arrived with Lidocaine intact to sacrum   3/29/2016: Arrived with lidocaine intact to sacrum  4/5/2016: Arrived with lidocaine intact to the sacrum  4/12/2016: Arrived with lidocaine intact to sacrum  4/19/2016:Arrived with lidocaine intact to sacrum  4/26/2016: Arrived with lidocaine intact to sacrum  5/3/2016: Arrived with nss moisten gauze, 5x9, tape intact to sacrum  5/10/2016: Arrived with lidocaine intact to sacrum  SDTI to ivon wound  5/17/2016: Arrived with nss moisten gauze stool covered  Incontinent of stool  Stool found in wound bed  History of Falling: Yes = 25   Secondary Diagnosis: Yes = 15   Ambulatory Aid None, Bedrest, Nurse Assist = 0   No = 0   Gait: Impaired = 20 -- Short steps with shuffle, may have difficulty arising from chair, head down, significantly impaired balance, requiring furniture, support person, or walking aid to walk  Mental Status: Overestimates, forgets limitations = 15   Total Score: 75     > 45 = High Risk       The most recent fall occurred 5/2016 and denies any recent falls  Nutrition Assessment Screening: Food intake over the last 3 months due to the loss of appetite, digestive problems, chewing or swallowing difficulties is graded as: 2 = no decrease in food intake   Mobility scored as: 0 = bed or chair bound  Psychological Stress and Acute Disease Scored as: 0 = The patient has experienced psychological stress or acute disease in the last 3 months  Neuropsychological problems scored as: 1 = mild dementia  Provider Wound Care HPI: no new wound complaints         Pain Assessment   the patient states they have pain  The pain is located in the sacrum  The pain radiates to the denies  The patient describes the pain as burning  (on a scale of 0 to 10, the patient rates the pain at 4 )   Abuse And Domestic Violence Screen   Domestic violence screen not done today  Reason DV Screen not done: staff of [address] [place] [address] present    Depression And Suicide Screen  Suicide screen not done today   Reason suicide screen not done: staff of [facility] [address]  Prefered Language is  [place]  Primary Language is  English  Readiness To Learn: Receptive  Barriers To Learning: hard of hearing  Preferred Learning: written   Education Completed: treatment/procedure   Teaching Method: written   Person Taught: staff of [facility]    Evaluation Of Learning: verbalized/demonstrated understanding and needs reinforcement      Active Problems    1  Decubitus ulcer of sacral region, stage 4 (880 48,888 35) (K34 112)    Past Medical History    1  History of Enlarged prostate without lower urinary tract symptoms (600 00) (N40 0)   2  History of anemia (V12 3) (Z86 2)   3  History of essential hypertension (V12 59) (Z86 79)   4  History of glaucoma (V12 49) (Z86 69)   5  History of hypothyroidism (V12 29) (Z86 39)   6  History of Hypocalcemia (275 41) (E83 51)    Current Meds   1  Acetaminophen 325 MG Oral Tablet; Therapy: (Recorded:15Mar2016) to Recorded   2  Adult Aspirin EC Low Strength 81 MG Oral Tablet Delayed Release; Therapy: (Recorded:15Mar2016) to Recorded   3  Allopurinol 100 MG Oral Tablet; Therapy: (Recorded:15Mar2016) to Recorded   4  Cosopt 22 3-6 8 MG/ML Ophthalmic Solution; Therapy: (Recorded:15Mar2016) to Recorded   5  Ferrous Sulfate 325 (65 Fe) MG Oral Tablet; Therapy: (Recorded:15Mar2016) to Recorded   6  Flomax 0 4 MG CP24;   Therapy: (Recorded:15Mar2016) to Recorded   7  Gonak 2 5 % Ophthalmic Solution; Therapy: (Recorded:15Mar2016) to Recorded   8  Lasix 20 MG Oral Tablet; Therapy: (Recorded:15Mar2016) to Recorded   9  Levothyroxine Sodium 125 MCG CAPS; Therapy: (Recorded:15Mar2016) to Recorded   10  Lidocaine HCl (Local Anesth ) 4 % SOLN; apply prn to wound(s) prior to deridement for    pain control; Therapy: (Recorded:15Mar2016) to Recorded   11  Lumigan 0 01 % Ophthalmic Solution; Therapy: (Recorded:15Mar2016) to Recorded   12  Metoprolol Tartrate 25 MG Oral Tablet;     Therapy: (Recorded:15Mar2016) to Recorded   13  OxyCODONE HCl - 5 MG Oral Tablet; Therapy: (Recorded:15Mar2016) to Recorded   14  Oyster Shell Calcium 500 MG Oral Tablet; Therapy: (Recorded:15Mar2016) to Recorded   15  Proscar 5 MG Oral Tablet; Therapy: (Recorded:15Mar2016) to Recorded   16  Sodium Bicarbonate 650 MG Oral Tablet; Therapy: (Recorded:15Mar2016) to Recorded   17  TobraDex 0 3-0 1 % Ophthalmic Suspension; Therapy: (Recorded:15Mar2016) to Recorded   18  Vitamin D3 1000 UNIT Oral Tablet; Therapy: (Recorded:15Mar2016) to Recorded    Allergies    1  Acetaminophen TABS   2  Hydrocodone-Acetaminophen CAPS   3  Morphine Sulfate TABS    Vitals  Vital Signs [Data Includes: Current Encounter]    Recorded: [id number] 01:52PM   Temperature 97 2 F, Tympanic   Heart Rate 76, L Radial   Pulse Quality Regular, L Radial   Respiration 18   Respiration Quality Normal   Systolic 037, LUE, Sitting   Diastolic 70, LUE, Sitting   Height 5 ft 8 in   Pain Scale 4     Physical Exam    Wound #1 Assessment wound #1 Location:, sacrum, started on unknown, Care for this wound started on 3/15/2016  Wound Status: not healed  Pressure Ulcer Grade: Stage IV  Length: 9cm x Width: 7cm x Depth: 2 7cm   Total: 63sq cm   Wound Volume: 170 1cm3       Undermining 3 5cm from 8 o'clock to 4 o'clock     deepest at 4 o clock   Tissue type: Subcutaneous, Granulation and Slough   Color of Wound: Yellow - 40% and Pink - 60%   Exudate Amount: Moderate   Exudate Type: Serosangiunous   Odor: None   Exudate Color: Yellow   Wound Edges: Rolled (epibolized)   Periwound Skin Condition: Denuded, Erythematous, Macerated, Induration        Physician/Provider Wound #1 Exam   I agree with the nursing assessment and documentation  Sacral wound with pink tissue on base, bone not exposed, edges rolling, periwound clean  Trg Chip 1:    Wound Nursing Care Plan   Impaired Tissue Integrity related to: sacrum Knowledge Deficit Related To: sacrum   Goals   Patient will maintain skin integrity:  Patient will demonstrate and verbalize knowledge of their disease process and management:  Patient will verbalize knowledge of and demonstrate adherence to interventions to achieve optimal nutrition required for wound healing:  Wound Nursing Care Interventions:   Implement offloading measures (turn & reposition schedule/method, ortho shoes/boots, floating heels, crutches, specialty surfaces:  Teach and evaluate effectiveness of offloading measures:  Teach patient and/or family about disease process and management methods:    Evaluate effectiveness of all above measures every 4 weeks with Patient Specific CQI:    Other:  plan of care initiated 3/15/2016, reviewed 4/12/2016, reviewed 5/3/2016      Procedure      Wound #1: sacrum     Nurse Dressing Change:   Wound #1 The wound located on the sacrum  Wound care rendered as per Physician/Advanced Practitioner order/plan  Order sent with patient to facility  Comments:    Excisional Debridement Subcutaneous Tissue:   Wound was excisionally debrided to subcutaneous tissue as follows  Prior to the procedure, the patient was identified using two identifiers, the general consent was signed, the proper site of procedure was identified, and a time out was taken  Anesthesia: Local anesthesia with 4% topical lidocaine was utilized prior to the procedure for pain control  A curette was utilized to surgically excise devitalized tissue and/or slough through epidermis, dermis and into the subcutaneous tissue  The total sq cm excised was 10sq cm  See wound assessment for further details  There was moderate bleeding controlled with gentle pressure  the patient tolerated the procedure well without complication  CPT Code(s)   [id number] - Excisional DebridementTo Subcutaneous Tissue; first 20 sq cm        Signatures   Electronically signed by : Rachel Riggins MD; May 17 2016  2:16PM EST                       (Author)    Electronically signed by : Lula Durham MD; May 24 2016  2:32PM EST                       (Author)